# Patient Record
Sex: MALE | Race: WHITE | NOT HISPANIC OR LATINO | URBAN - METROPOLITAN AREA
[De-identification: names, ages, dates, MRNs, and addresses within clinical notes are randomized per-mention and may not be internally consistent; named-entity substitution may affect disease eponyms.]

---

## 2020-08-12 ENCOUNTER — OFFICE VISIT (OUTPATIENT)
Dept: FAMILY MEDICINE CLINIC | Facility: CLINIC | Age: 70
End: 2020-08-12

## 2020-08-12 VITALS
HEIGHT: 72 IN | BODY MASS INDEX: 29.15 KG/M2 | DIASTOLIC BLOOD PRESSURE: 70 MMHG | SYSTOLIC BLOOD PRESSURE: 140 MMHG | WEIGHT: 215.2 LBS | OXYGEN SATURATION: 96 % | HEART RATE: 94 BPM | TEMPERATURE: 98.2 F

## 2020-08-12 DIAGNOSIS — I10 ESSENTIAL HYPERTENSION: ICD-10-CM

## 2020-08-12 DIAGNOSIS — E11.40 PAINFUL DIABETIC NEUROPATHY (HCC): ICD-10-CM

## 2020-08-12 DIAGNOSIS — K21.9 GASTROESOPHAGEAL REFLUX DISEASE WITHOUT ESOPHAGITIS: ICD-10-CM

## 2020-08-12 DIAGNOSIS — E11.42 TYPE 2 DIABETES MELLITUS WITH DIABETIC POLYNEUROPATHY, WITH LONG-TERM CURRENT USE OF INSULIN (HCC): ICD-10-CM

## 2020-08-12 DIAGNOSIS — Z12.11 COLON CANCER SCREENING: ICD-10-CM

## 2020-08-12 DIAGNOSIS — F41.9 CHRONIC ANXIETY: Primary | ICD-10-CM

## 2020-08-12 DIAGNOSIS — Z11.59 ENCOUNTER FOR HEPATITIS C SCREENING TEST FOR LOW RISK PATIENT: ICD-10-CM

## 2020-08-12 DIAGNOSIS — M17.11 PRIMARY OSTEOARTHRITIS OF RIGHT KNEE: ICD-10-CM

## 2020-08-12 DIAGNOSIS — M15.9 OSTEOARTHRITIS, GENERALIZED: ICD-10-CM

## 2020-08-12 DIAGNOSIS — Z79.4 TYPE 2 DIABETES MELLITUS WITH DIABETIC POLYNEUROPATHY, WITH LONG-TERM CURRENT USE OF INSULIN (HCC): ICD-10-CM

## 2020-08-12 PROBLEM — F41.1 GAD (GENERALIZED ANXIETY DISORDER): Status: ACTIVE | Noted: 2018-01-30

## 2020-08-12 PROBLEM — N40.0 BPH (BENIGN PROSTATIC HYPERPLASIA): Status: ACTIVE | Noted: 2018-01-30

## 2020-08-12 PROCEDURE — G0439 PPPS, SUBSEQ VISIT: HCPCS | Performed by: FAMILY MEDICINE

## 2020-08-12 RX ORDER — DONEPEZIL HYDROCHLORIDE 10 MG/1
10 TABLET, FILM COATED ORAL DAILY
COMMUNITY
Start: 2020-03-11 | End: 2020-10-21

## 2020-08-12 RX ORDER — AMLODIPINE BESYLATE AND BENAZEPRIL HYDROCHLORIDE 10; 40 MG/1; MG/1
1 CAPSULE ORAL DAILY
COMMUNITY
Start: 2019-09-14

## 2020-08-12 RX ORDER — DULOXETIN HYDROCHLORIDE 30 MG/1
30 CAPSULE, DELAYED RELEASE ORAL
COMMUNITY
Start: 2019-09-14 | End: 2020-08-12

## 2020-08-12 RX ORDER — DIAZEPAM 5 MG/1
5 TABLET ORAL EVERY 8 HOURS PRN
Qty: 270 TABLET | Refills: 0 | Status: SHIPPED | OUTPATIENT
Start: 2020-08-12 | End: 2020-10-21 | Stop reason: SDUPTHER

## 2020-08-12 RX ORDER — GABAPENTIN 100 MG/1
100 CAPSULE ORAL
COMMUNITY
Start: 2019-09-19 | End: 2020-08-12

## 2020-08-12 RX ORDER — MELOXICAM 15 MG/1
15 TABLET ORAL DAILY
Qty: 90 TABLET | Refills: 1 | Status: SHIPPED | OUTPATIENT
Start: 2020-08-12

## 2020-08-12 RX ORDER — PAROXETINE HYDROCHLORIDE 20 MG/1
20 TABLET, FILM COATED ORAL DAILY
COMMUNITY
Start: 2020-06-15 | End: 2021-06-15

## 2020-08-12 NOTE — PROGRESS NOTES
Subsequent Medicare Wellness Visit   The ABC's of the Annual Wellness Visit    Chief Complaint   Patient presents with   • Medicare Wellness-subsequent       HPI:  Isaiah Bruno, -1950, is a 69 y.o. male who presents for a Subsequent Medicare Wellness Visit.    htn- doing well on meds  gerd- doing well  dm2- doing well on meds, no lows, taking 45 units Qam, 10 units at lunch, and 45 units at night.   Neuropathy- well controlled on meds, in feet, wants to stop meds as he can do well off it and it caused too much sedation.   OA- in knee, topicals not helping. Has seen ortho and injections not helping.   Anxiety- doing ok on meds, still needing valium and not able to taper off.     Recent Hospitalizations:  No hospitalization(s) within the last year..    Current Medical Providers:  Patient Care Team:  Gwyn Rowe Jr., MD (Inactive) as PCP - General (Internal Medicine)    Health Habits and Functional and Cognitive Screening and Depression Screening:  Functional & Cognitive Status 2020   Do you have difficulty preparing food and eating? No   Do you have difficulty bathing yourself, getting dressed or grooming yourself? No   Do you have difficulty using the toilet? No   Do you have difficulty moving around from place to place? No   Do you have trouble with steps or getting out of a bed or a chair? No   Current Diet Well Balanced Diet   Dental Exam Up to date   Eye Exam Up to date   Exercise (times per week) 0 times per week   Current Exercise Activities Include None   Do you need help using the phone?  No   Are you deaf or do you have serious difficulty hearing?  Yes   Do you need help with transportation? No   Do you need help shopping? No   Do you need help preparing meals?  No   Do you need help with housework?  No   Do you need help with laundry? No   Do you need help taking your medications? No   Do you need help managing money? No   Do you ever drive or ride in a car without wearing a seat belt?  Yes   Have you felt unusual stress, anger or loneliness in the last month? Yes   Who do you live with? Spouse   If you need help, do you have trouble finding someone available to you? No   Do you have difficulty concentrating, remembering or making decisions? No       Compared to one year ago, the patient feels his physical health is the same and his mental health is the same.    Depression Screen:  No flowsheet data found.      Past Medical/Family/Social History:  The following portions of the patient's history were reviewed and updated as appropriate: allergies, current medications, past family history, past medical history, past social history, past surgical history and problem list.    No Known Allergies      Current Outpatient Medications:   •  amLODIPine-benazepril (LOTREL) 10-40 MG per capsule, Take 1 capsule by mouth Daily., Disp: , Rfl:   •  JT CONTOUR TEST test strip, TEST BLOOD SUGAR TID, Disp: , Rfl: 10  •  Blood Glucose Monitoring Suppl (Amplidata CONTOUR MONITOR) W/DEVICE kit, USE TO TEST BLOOD SUGAR UTD, Disp: , Rfl: 1  •  diazePAM (VALIUM) 5 MG tablet, Take 1 tablet by mouth Every 8 (Eight) Hours As Needed for Anxiety. TK 1 T PO TID, Disp: 270 tablet, Rfl: 0  •  MICROLET LANCETS misc, USE TO TEST BLOOD SUGAR TID, Disp: , Rfl: 11  •  NOVOLOG MIX 70/30 FLEXPEN (70-30) 100 UNIT/ML suspension pen-injector injection, 30 Units 2 (two) times a day before meals., Disp: , Rfl:   •  PARoxetine (PAXIL) 20 MG tablet, Take 20 mg by mouth Daily., Disp: , Rfl:   •  donepezil (ARICEPT) 10 MG tablet, Take 10 mg by mouth Daily., Disp: , Rfl:   •  hydrochlorothiazide (HYDRODIURIL) 12.5 MG tablet, Take 1 tablet by mouth daily. TK 1 T PO QD, Disp: 90 tablet, Rfl: 3  •  meloxicam (Mobic) 15 MG tablet, Take 1 tablet by mouth Daily., Disp: 90 tablet, Rfl: 1  •  metFORMIN (GLUCOPHAGE) 500 MG tablet, 500 mg., Disp: , Rfl:     Aspirin use counseling: Does not need ASA (and currently is not on it)    Current medication list  "contains high risk medications. No harmful drug interactions have been identified.  Plan of action quality of life    Family History   Problem Relation Age of Onset   • Stroke Mother    • Diabetes Mother    • Anxiety disorder Mother    • No Known Problems Father        Social History     Tobacco Use   • Smoking status: Current Every Day Smoker   • Smokeless tobacco: Never Used   Substance Use Topics   • Alcohol use: No       Past Surgical History:   Procedure Laterality Date   • FINGER TENDON REPAIR      right ring finger   • HIATAL HERNIA REPAIR  1978   • KNEE ARTHROSCOPY         Patient Active Problem List   Diagnosis   • Type 2 diabetes mellitus (CMS/HCC)   • Essential hypertension   • Osteoarthritis of knee   • Chronic anxiety   • Acute arthritis   • BPH (benign prostatic hyperplasia)   • TOMMY (generalized anxiety disorder)   • GERD (gastroesophageal reflux disease)   • Osteoarthritis, generalized   • Painful diabetic neuropathy (CMS/HCC)   • Encounter for hepatitis C screening test for low risk patient       Review of Systems   Respiratory: Negative for shortness of breath.    Cardiovascular: Negative for chest pain.       Objective     Vitals:    08/12/20 1129   BP: 140/70   Pulse: 94   Temp: 98.2 °F (36.8 °C)   TempSrc: Temporal   SpO2: 96%   Weight: 97.6 kg (215 lb 3.2 oz)   Height: 182.9 cm (72\")       Patient's Body mass index is 29.19 kg/m². BMI is above normal parameters. Recommendations include: exercise counseling.      No exam data present    The patient has no evidence of cognitve impairment.  The patient has no evidence of cognitive impairment. 3/3 items were correctly remembered at 5 minutes.     Physical Exam   Constitutional: He is oriented to person, place, and time. He appears well-developed and well-nourished.   Cardiovascular: Normal rate, regular rhythm, normal heart sounds and intact distal pulses.   Pulmonary/Chest: Effort normal and breath sounds normal.   Musculoskeletal: Normal range of " motion. He exhibits no edema.   Neurological: He is alert and oriented to person, place, and time.   Skin: Skin is warm and dry.   Psychiatric: He has a normal mood and affect. His behavior is normal.       Recent Lab Results:  Lab Results   Component Value Date     (H) 03/11/2020     Lab Results   Component Value Date    TRIG 82 03/11/2020    HDL 54 03/11/2020    VLDL 16 03/11/2020       Assessment/Plan   Age-appropriate Screening Schedule:  Refer to the list below for future screening recommendations based on patient's age, sex and/or medical conditions.      Health Maintenance   Topic Date Due   • TDAP/TD VACCINES (1 - Tdap) 11/13/1959   • ZOSTER VACCINE (1 of 2) 11/13/1998   • DIABETIC FOOT EXAM  04/01/2016   • DIABETIC EYE EXAM  04/01/2016   • INFLUENZA VACCINE  08/01/2020   • HEMOGLOBIN A1C  09/11/2020   • URINE MICROALBUMIN  03/11/2021       Medicare Risks and Personalized Health Plan:  Advance Directive Discussion      CMS-Preventive Services Quick Reference  Medicare Preventive Services Addressed:  Annual Wellness Visit (AWV)    Advance Care Planning:  ACP discussion was held with the patient during this visit. Patient does not have an advance directive, information provided.    Diagnoses and all orders for this visit:    1. Chronic anxiety (Primary)  -     diazePAM (VALIUM) 5 MG tablet; Take 1 tablet by mouth Every 8 (Eight) Hours As Needed for Anxiety. TK 1 T PO TID  Dispense: 270 tablet; Refill: 0    2. Primary osteoarthritis of right knee  -     meloxicam (Mobic) 15 MG tablet; Take 1 tablet by mouth Daily.  Dispense: 90 tablet; Refill: 1    3. Essential hypertension    4. Gastroesophageal reflux disease without esophagitis    5. Type 2 diabetes mellitus with diabetic polyneuropathy, with long-term current use of insulin (CMS/[facility])  -     Comprehensive Metabolic Panel  -     Lipid Panel  -     Hemoglobin A1c  -     Microalbumin / Creatinine Urine Ratio - Urine, Clean Catch    6. Painful diabetic  neuropathy (CMS/HCC)    7. Osteoarthritis, generalized    8. Encounter for hepatitis C screening test for low risk patient  -     Hepatitis C Antibody    9. Colon cancer screening  -     Cologuard - Stool, Per Rectum        An After Visit Summary and PPPS with all of these plans were given to the patient.      Follow Up:  Return in about 6 months (around 2/12/2021) for Recheck.          Risks and benefits discussed and f/u in 1 month. If mobic does not work, will consider adding in hydrocodone again but only once a day. Cont meds. Discussed risk factors.

## 2020-08-13 ENCOUNTER — TELEPHONE (OUTPATIENT)
Dept: FAMILY MEDICINE CLINIC | Facility: CLINIC | Age: 70
End: 2020-08-13

## 2020-08-13 LAB
ALBUMIN SERPL-MCNC: 4.3 G/DL (ref 3.5–5.2)
ALBUMIN/CREAT UR: 13 MG/G CREAT (ref 0–29)
ALBUMIN/GLOB SERPL: 2.3 G/DL
ALP SERPL-CCNC: 100 U/L (ref 39–117)
ALT SERPL-CCNC: 11 U/L (ref 1–41)
AST SERPL-CCNC: 14 U/L (ref 1–40)
BILIRUB SERPL-MCNC: 0.2 MG/DL (ref 0–1.2)
BUN SERPL-MCNC: 23 MG/DL (ref 8–23)
BUN/CREAT SERPL: 22.8 (ref 7–25)
CALCIUM SERPL-MCNC: 9.4 MG/DL (ref 8.6–10.5)
CHLORIDE SERPL-SCNC: 100 MMOL/L (ref 98–107)
CHOLEST SERPL-MCNC: 167 MG/DL (ref 0–200)
CO2 SERPL-SCNC: 27.1 MMOL/L (ref 22–29)
CREAT SERPL-MCNC: 1.01 MG/DL (ref 0.76–1.27)
CREAT UR-MCNC: 171.3 MG/DL
GLOBULIN SER CALC-MCNC: 1.9 GM/DL
GLUCOSE SERPL-MCNC: 244 MG/DL (ref 65–99)
HBA1C MFR BLD: 7.1 % (ref 4.8–5.6)
HCV AB S/CO SERPL IA: <0.1 S/CO RATIO (ref 0–0.9)
HDLC SERPL-MCNC: 52 MG/DL (ref 40–60)
LDLC SERPL CALC-MCNC: 92 MG/DL (ref 0–100)
MICROALBUMIN UR-MCNC: 22.5 UG/ML
POTASSIUM SERPL-SCNC: 4.9 MMOL/L (ref 3.5–5.2)
PROT SERPL-MCNC: 6.2 G/DL (ref 6–8.5)
SODIUM SERPL-SCNC: 138 MMOL/L (ref 136–145)
TRIGL SERPL-MCNC: 113 MG/DL (ref 0–150)
VLDLC SERPL CALC-MCNC: 22.6 MG/DL

## 2020-08-13 NOTE — TELEPHONE ENCOUNTER
Patient called regarding the prescription for Valium.  He said the instructions must just say name brand only.  He says when it was sent over it states patient prefers, but it can't say that.  He wants to know if a updated prescription could be sent in please or if someone could call the pharmacy.  It goes to [facility], phone number is [phone].  His number is [phone]. He wants to know if this could be done today.  He says the way it's written now, it will cost him 1300.00

## 2020-08-17 ENCOUNTER — TELEPHONE (OUTPATIENT)
Dept: FAMILY MEDICINE CLINIC | Facility: CLINIC | Age: 70
End: 2020-08-17

## 2020-08-17 NOTE — TELEPHONE ENCOUNTER
Pt states that generic does not work and needs name brand for Valuim.    Pt wants to be informed when this has been taken care of.

## 2020-08-17 NOTE — TELEPHONE ENCOUNTER
[facility] needs clarification on Valium. RX says do not substitute. Is this provider do not substitute or patient do ot substitute? Says it makes a difference in copay. Ref #[id number]. Phone [phone]

## 2020-10-21 ENCOUNTER — OFFICE VISIT (OUTPATIENT)
Dept: FAMILY MEDICINE CLINIC | Facility: CLINIC | Age: 70
End: 2020-10-21

## 2020-10-21 VITALS
HEIGHT: 72 IN | TEMPERATURE: 98.7 F | WEIGHT: 214 LBS | OXYGEN SATURATION: 98 % | HEART RATE: 80 BPM | BODY MASS INDEX: 28.99 KG/M2 | DIASTOLIC BLOOD PRESSURE: 68 MMHG | SYSTOLIC BLOOD PRESSURE: 140 MMHG

## 2020-10-21 DIAGNOSIS — F41.9 CHRONIC ANXIETY: ICD-10-CM

## 2020-10-21 DIAGNOSIS — Z23 NEED FOR IMMUNIZATION AGAINST INFLUENZA: ICD-10-CM

## 2020-10-21 DIAGNOSIS — M15.9 OSTEOARTHRITIS, GENERALIZED: Primary | ICD-10-CM

## 2020-10-21 DIAGNOSIS — F41.1 GAD (GENERALIZED ANXIETY DISORDER): ICD-10-CM

## 2020-10-21 PROBLEM — Z11.59 ENCOUNTER FOR HEPATITIS C SCREENING TEST FOR LOW RISK PATIENT: Status: RESOLVED | Noted: 2020-08-12 | Resolved: 2020-10-21

## 2020-10-21 PROCEDURE — 99214 OFFICE O/P EST MOD 30 MIN: CPT | Performed by: FAMILY MEDICINE

## 2020-10-21 PROCEDURE — G0008 ADMIN INFLUENZA VIRUS VAC: HCPCS | Performed by: FAMILY MEDICINE

## 2020-10-21 PROCEDURE — 90694 VACC AIIV4 NO PRSRV 0.5ML IM: CPT | Performed by: FAMILY MEDICINE

## 2020-10-21 RX ORDER — DIAZEPAM 5 MG/1
5 TABLET ORAL EVERY 8 HOURS PRN
Qty: 270 TABLET | Refills: 0 | Status: SHIPPED | OUTPATIENT
Start: 2020-10-21 | End: 2020-10-27

## 2020-10-21 RX ORDER — HYDROCODONE BITARTRATE AND ACETAMINOPHEN 7.5; 325 MG/1; MG/1
1 TABLET ORAL EVERY 6 HOURS PRN
Qty: 90 TABLET | Refills: 0 | Status: SHIPPED | OUTPATIENT
Start: 2020-10-21 | End: 2021-03-10

## 2020-10-21 RX ORDER — DIAZEPAM 5 MG/1
5 TABLET ORAL EVERY 8 HOURS PRN
Qty: 270 TABLET | Refills: 0 | Status: SHIPPED | OUTPATIENT
Start: 2020-10-21 | End: 2020-10-21 | Stop reason: SDUPTHER

## 2020-10-21 NOTE — PROGRESS NOTES
"Subjective   Isaiah Bruno is a 69 y.o. male.     Chief Complaint   Patient presents with   • Pain     joint pain- med didn't help       History of Present Illness   OA- in knee, topicals not helping. Has seen ortho and injections not helping.   Tried meloxicam and it was not helping.  Is been on hydrocodone in the past and this controls his pain well.  He is wanting to go back on this. It helps him do adl's.     Anxiety- doing ok on meds, still needing valium and not able to taper off. Moods good.     The following portions of the patient's history were reviewed and updated as appropriate: allergies, current medications, past family history, past medical history, past social history, past surgical history and problem list.    Past Medical History:   Diagnosis Date   • Anxiety    • Diabetes mellitus (CMS/HCC)    • Hypertension    • Osteoarthritis    • Premature ventricular contractions        Past Surgical History:   Procedure Laterality Date   • CATARACT EXTRACTION     • EYE CAPSULOTOMY WITH LASER     • FINGER TENDON REPAIR      right ring finger   • HIATAL HERNIA REPAIR  1978   • KNEE ARTHROSCOPY         Family History   Problem Relation Age of Onset   • Stroke Mother    • Diabetes Mother    • Anxiety disorder Mother    • No Known Problems Father        Social History     Socioeconomic History   • Marital status:      Spouse name: Not on file   • Number of children: Not on file   • Years of education: Not on file   • Highest education level: Not on file   Tobacco Use   • Smoking status: Current Every Day Smoker   • Smokeless tobacco: Never Used   Substance and Sexual Activity   • Alcohol use: No   • Drug use: Never       Review of Systems   Constitutional: Negative for fever.   Respiratory: Negative for shortness of breath.        Objective   Visit Vitals  /68 (BP Location: Right arm, Patient Position: Sitting, Cuff Size: Large Adult)   Pulse 80   Temp 98.7 °F (37.1 °C) (Infrared)   Ht 182.9 cm (72\") "   Wt 97.1 kg (214 lb)   SpO2 98%   BMI 29.02 kg/m²     Body mass index is 29.02 kg/m².  Physical Exam  Constitutional:       Appearance: Normal appearance. He is well-developed.   Cardiovascular:      Rate and Rhythm: Normal rate and regular rhythm.      Heart sounds: Normal heart sounds.   Pulmonary:      Effort: Pulmonary effort is normal.      Breath sounds: Normal breath sounds.   Musculoskeletal: Normal range of motion.         General: No swelling.   Skin:     General: Skin is warm and dry.      Findings: No rash.   Neurological:      General: No focal deficit present.      Mental Status: He is alert and oriented to person, place, and time.   Psychiatric:         Mood and Affect: Mood normal.         Behavior: Behavior normal.           Assessment/Plan   Diagnoses and all orders for this visit:    1. Osteoarthritis, generalized (Primary)  -     HYDROcodone-acetaminophen (NORCO) 7.5-325 MG per tablet; Take 1 tablet by mouth Every 6 (Six) Hours As Needed for Moderate Pain .  Dispense: 90 tablet; Refill: 0    2. TOMMY (generalized anxiety disorder)    3. Chronic anxiety  -     diazePAM (VALIUM) 5 MG tablet; Take 1 tablet by mouth Every 8 (Eight) Hours As Needed for Anxiety. TK 1 T PO TID  Dispense: 270 tablet; Refill: 0    4. Need for immunization against influenza  -     Fluad Quad 65+ yrs ([id number])        Rolf run and reviewed. Discussed risks including but not limited to fatigue, falls, constipation, somnolence, dependence and addiction. Also discussed alternatives etc.     Cont meds, added pain med back in. F/u in 3 months.

## 2020-10-21 NOTE — TELEPHONE ENCOUNTER
Patient called and states the Valium that was called in was supposed to be called in as name brand only, but the generic was sent over.  He wants to know if it could be called in to [facility].  His phone number is [phone].

## 2020-10-21 NOTE — TELEPHONE ENCOUNTER
Patient forgot to mention that he needs a refill on Novolog 70/30 flex pen sent to [facility]

## 2020-10-24 ENCOUNTER — PATIENT MESSAGE (OUTPATIENT)
Dept: FAMILY MEDICINE CLINIC | Facility: CLINIC | Age: 70
End: 2020-10-24

## 2020-10-26 NOTE — TELEPHONE ENCOUNTER
[facility] called and said they canceled the generic Valium. They need a new prescription called in for name brand only Valium. Patient is running short of his medicine.  Yenifer [phone]

## 2020-10-26 NOTE — TELEPHONE ENCOUNTER
Pt spouse called and stated that the pharmacy gave them the following ph# [phone] to call regarding his medication.

## 2020-10-26 NOTE — TELEPHONE ENCOUNTER
I put on his prescription that it is to be name brand only.  I checked the dispense as written box.  And I put it in his message to the pharmacy.  This is a pharmacy problem.  I do not know if you need to call the pharmacy or what.

## 2020-10-26 NOTE — TELEPHONE ENCOUNTER
Patient's wife called regarding the Valium, she said it needs to be name brand.  She wants to know if some one could call regarding this and and let her know when it's done.  Her number is [phone].

## 2020-10-27 DIAGNOSIS — F41.9 CHRONIC ANXIETY: ICD-10-CM

## 2020-10-27 RX ORDER — DIAZEPAM 5 MG
5 TABLET ORAL EVERY 8 HOURS PRN
Qty: 270 TABLET | Refills: 0 | Status: SHIPPED | OUTPATIENT
Start: 2020-10-27 | End: 2020-10-28 | Stop reason: SDUPTHER

## 2020-10-27 RX ORDER — DIAZEPAM 5 MG
TABLET ORAL
Qty: 270 TABLET | Refills: 0 | Status: SHIPPED | OUTPATIENT
Start: 2020-10-27 | End: 2020-10-27 | Stop reason: SDUPTHER

## 2020-10-28 DIAGNOSIS — F41.9 CHRONIC ANXIETY: ICD-10-CM

## 2020-10-28 RX ORDER — DIAZEPAM 5 MG
5 TABLET ORAL EVERY 8 HOURS PRN
Qty: 270 TABLET | Refills: 0 | Status: SHIPPED | OUTPATIENT
Start: 2020-10-28

## 2020-10-28 NOTE — TELEPHONE ENCOUNTER
CHOLO De [facility] called and states that the patient orginally stated that he didn't want to fill the valium that was sent over yesterday so a hold was placed on the prescription.  Then he called back and stated he did want the medication.  She said since it's a controlled substance they can't remove the hold and a new prescription would need to be sent over.  She wants to know if a new one could be sent over today please.  Pharmacy phone number is [phone].

## 2020-11-05 ENCOUNTER — TELEPHONE (OUTPATIENT)
Dept: FAMILY MEDICINE CLINIC | Facility: CLINIC | Age: 70
End: 2020-11-05

## 2020-11-05 NOTE — TELEPHONE ENCOUNTER
[facility] message sent to patient on how he is taking the insulin.  This was a transferred prescription from his previous provider.

## 2020-11-05 NOTE — TELEPHONE ENCOUNTER
[facility] STATES THE FOLLOWING INSTRUCTIONS ON THIS MEDICATION ARE TO VAGUE:    Pharmacy:  [facility] - [place], [place] - [address] AT Portal to Registered [facility] Sites - [phone]  - [phone]   Phone:  [phone]   Fax:  [phone]   Address:  [facility] [address] [facility] [address]   Pharmacy Comments: --          Fill quantity remaining: -- Fill quantity used: -- Next fill due: --       Warnings History    No Interaction Warnings Shown    Pharmacist Clinical Review History    This prescription has not been clinically reviewed.   E-Prescribing Status    Outpatient Medication Detail    insulin aspart protamine & aspart (NOVOLOG) 70/30 100 unit/mL        Sig: Inject 45 units under the skin into the appropriate area as directed.        Sent to pharmacy as: Insulin Aspart Prot & Aspart (70-30) 100 UNIT/ML Suspension Pen-injector (NOVOLOG)        Class: Normal        E-Prescribing Status: Receipt confirmed by pharmacy (10/23/2020 12:33 PM EDT)          3 PACKS FOR 90 DAYS?      NEED FREQUENCY OF INJECTION.    REFERENCE #: [id number]    [facility] [phone]

## 2020-12-23 ENCOUNTER — TELEPHONE (OUTPATIENT)
Dept: FAMILY MEDICINE CLINIC | Facility: CLINIC | Age: 70
End: 2020-12-23

## 2020-12-23 DIAGNOSIS — Z12.11 COLON CANCER SCREENING: Primary | ICD-10-CM

## 2020-12-23 NOTE — TELEPHONE ENCOUNTER
Received fax from [facility] -   Pt has indicated they do not wish to complete screening.  Pt wants a C-sope

## 2021-03-10 ENCOUNTER — OFFICE VISIT (OUTPATIENT)
Dept: ORTHOPEDIC SURGERY | Facility: CLINIC | Age: 71
End: 2021-03-10

## 2021-03-10 VITALS — HEIGHT: 72 IN | WEIGHT: 218 LBS | TEMPERATURE: 97 F | BODY MASS INDEX: 29.53 KG/M2

## 2021-03-10 DIAGNOSIS — M19.079 ARTHRITIS OF ANKLE: ICD-10-CM

## 2021-03-10 DIAGNOSIS — M19.071 ARTHRITIS OF FIRST METATARSOPHALANGEAL (MTP) JOINT OF RIGHT FOOT: ICD-10-CM

## 2021-03-10 DIAGNOSIS — M19.079 ARTHRITIS OF FOOT: ICD-10-CM

## 2021-03-10 DIAGNOSIS — R52 PAIN: Primary | ICD-10-CM

## 2021-03-10 DIAGNOSIS — M92.60 HAGLUND'S DEFORMITY: ICD-10-CM

## 2021-03-10 DIAGNOSIS — M19.072 ARTHRITIS OF FIRST METATARSOPHALANGEAL (MTP) JOINT OF LEFT FOOT: ICD-10-CM

## 2021-03-10 DIAGNOSIS — M65.28 CALCIFIC ACHILLES TENDONITIS: ICD-10-CM

## 2021-03-10 PROCEDURE — 73630 X-RAY EXAM OF FOOT: CPT | Performed by: ORTHOPAEDIC SURGERY

## 2021-03-10 PROCEDURE — 99204 OFFICE O/P NEW MOD 45 MIN: CPT | Performed by: ORTHOPAEDIC SURGERY

## 2021-03-10 PROCEDURE — 73610 X-RAY EXAM OF ANKLE: CPT | Performed by: ORTHOPAEDIC SURGERY

## 2021-03-10 RX ORDER — ESCITALOPRAM OXALATE 10 MG/1
20 TABLET ORAL DAILY
COMMUNITY
Start: 2021-01-05

## 2021-03-10 RX ORDER — LUTEIN 10 MG
TABLET ORAL
COMMUNITY
End: 2023-03-08 | Stop reason: HOSPADM

## 2021-03-10 NOTE — PROGRESS NOTES
New Patient Complaint      Patient: Isaiah Bruno  YOB: 1950 70 y.o. male  Medical Record Number: [id number]    Chief Complaints: Feet hurt    History of Present Illness: Patient had previous right foot cheilectomy done by Dr. Mccray in 2003.    He reports chronic moderate to severe aching pain in both feet left greater than right that has worsened recently as well as a 1 month history of moderate aching pain in the left ankle.  Symptoms are associated with throbbing grinding aching swelling and stiffness worse with standing walking and stairs improved with rest.  He previously been on hydrocodone but took himself off of that and uses etodolac.  Symptoms have worsened recently in the left foot much more so than the right.     He has tried multiple orthotics in the past and is somewhat reluctant about joint injection as he has had his knee injected in the past that altered his blood sugars.        HPI    Allergies: No Known Allergies    Medications:   Current Outpatient Medications on File Prior to Visit   Medication Sig   • amLODIPine-benazepril (LOTREL) 10-40 MG per capsule Take 1 capsule by mouth Daily.   • Cholecalciferol 50 MCG (2000 UT) tablet Take  by mouth.   • escitalopram (LEXAPRO) 10 MG tablet    • hydrochlorothiazide (HYDRODIURIL) 12.5 MG tablet Take 1 tablet by mouth daily. TK 1 T PO QD   • insulin aspart protamine & aspart (NOVOLOG) 70/30 100 unit/mL Inject 45 units under the skin into the appropriate area as directed.   • Lutein 10 MG tablet Take  by mouth.   • meloxicam (Mobic) 15 MG tablet Take 1 tablet by mouth Daily.   • PARoxetine (PAXIL) 20 MG tablet Take 20 mg by mouth Daily.   • Valium 5 MG tablet Take 1 tablet by mouth Every 8 (Eight) Hours As Needed for Anxiety.   • [DISCONTINUED] HYDROcodone-acetaminophen (NORCO) 7.5-325 MG per tablet Take 1 tablet by mouth Every 6 (Six) Hours As Needed for Moderate Pain .     No current facility-administered medications on file prior to  "visit.       Past Medical History:   Diagnosis Date   • Anxiety    • Diabetes mellitus (CMS/HCC)    • Hypertension    • Osteoarthritis    • Premature ventricular contractions      Past Surgical History:   Procedure Laterality Date   • CATARACT EXTRACTION     • EYE CAPSULOTOMY WITH LASER     • FINGER TENDON REPAIR      right ring finger   • HIATAL HERNIA REPAIR  1978   • KNEE ARTHROSCOPY       Social History     Occupational History   • Not on file   Tobacco Use   • Smoking status: Current Every Day Smoker   • Smokeless tobacco: Never Used   Substance and Sexual Activity   • Alcohol use: No   • Drug use: Never   • Sexual activity: Not on file      Social History     Social History Narrative   • Not on file     Family History   Problem Relation Age of Onset   • Stroke Mother    • Diabetes Mother    • Anxiety disorder Mother    • No Known Problems Father        Review of Systems: 14 point review of systems performed, positive pertinent findings identified in HPI. All remaining systems negative except activity change, hearing loss, palpitations, nervous or anxious    Review of Systems      Physical Exam:   Vitals:    03/10/21 1016   Temp: 97 °F (36.1 °C)   Weight: 98.9 kg (218 lb)   Height: 182.9 cm (72\")   PainSc:   5     Physical Exam   Constitutional: pleasant, well developed Is with his wife  Eyes: sclera non icteric  Hearing : adequate for exam  Cardiovascular: palpable pulses in bilaeral feet, bilateral calves/ thighs NT without sign of DVT  Respiratoy: breathing unlabored   Neurological: grossly sensate to LT throughout bilateral LEs  Psychiatric: oriented with normal mood and affect.   Lymphatic: No palpable popliteal lymphadenopathy bilateral LEs  Skin: intact throughout bilateral legs/feet with the exception of a small abrasion over the dorsal medial aspect of the left first toe from a recent scraping injury without sign of infection  Musculoskeletal: Mild discomfort of the anterior left ankle and distal " tibia.  Moderate discomfort with dorsum of the midfoot left more so than right and well-healed surgical incision right first toe.  There was limited motion of the first MTP joints bilaterally with mild discomfort left greater than right.  Physical Exam  Ortho Exam    Radiology: 3 views of both feet ordered to evaluate pain reviewed and no prior x-rays available for comparison there is significant arthritic change of both first MTP joints with dorsal spurring left greater than right there is also calcifications at the insertion of the Achilles with prominent superior calcaneal tuberosity bilaterally and plantar calcaneal spurring.  There is also moderate arthritic change to the midfoot especially the second and third TMT joints.    3 views left ankle ordered evaluate pain reviewed and compared with lateral view of the ankle done on foot x-rays.  Overall talus appears well-seated within the mortise some well-rounded ossification over the medial distal tibia but no obvious fracture.        Assessment/Plan: Left ankle anterior and distal tibial pain with possible stress fracture  2.  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    A long discussion regarding treatment options he declined any boot or brace for the left ankle.  Recommend a top of this with a cane which she already has and has been intermittently using some Voltaren gel which seems to give him some relief and will continue as such.    We need to get an MRI of his left ankle and foot to evaluate for stress fracture and/or determine degree of arthrosis.    We will see him back in 3 to 4 weeks to review MRIs assess progress and determine treatment course going forward.    Recommend the patient see primary care provider for issues outlined in review of symptoms not pertinent to current orthopedic complaints    This is  "an addendum to patient's previous note.  This addendum was placed on 3/25/2021.  I spoke with patient and he was upset about the problems in his chart regarding use of hydrocodone.  He said he did not have any problem with me at all but was upset about these problems in the chart regarding hydrocodone.  He stated that he had quit taking hydrocodone in May 2019 and his primary care provider had prescribed it again for him in October 2020 but he never had it filled.  Plan I would make sure this was clear in my record as far as adding it to this note and will place in a telephone encounter as well and he has been working with Magali Duque on getting his record \"straightened out.    "

## 2021-03-12 ENCOUNTER — TELEPHONE (OUTPATIENT)
Dept: ORTHOPEDIC SURGERY | Facility: CLINIC | Age: 71
End: 2021-03-12

## 2021-03-12 NOTE — TELEPHONE ENCOUNTER
"Called pt to get some clarification on what he's talking about when he states we've \"made mess of his chart\" , no answer. I looked in pts meds and I don't even see any hydrocodone?? LVM for pt to call back.   "

## 2021-03-12 NOTE — TELEPHONE ENCOUNTER
----- Message from [facility] sent at 3/11/2021 11:10 PM EST -----  Regarding: RE: Prescription Question  Contact: [phone]  To Celestino Claudio,   I was not taking Hydrocodone.  I have not taken in over a year.   dr brewer sent a prescription to dominick at [address] [place] [address] on Aug12,2020.  I call them an canceled it.  My records show all this.  It also was not on my chart.  It showed on her records she sent a prescription for it , It was never filled. I told her I never filled it.  [facility] also have a record it was canceled Also my [employer] employee insurance  show it was never filled.  They keep records of all this.  I went to Dr Brewer only twice in over a year.   I HAD NOT TAKEN HYDROCODONE IN OVER A YEAR.  The goverment also have a record of precription like this I have filled . You had no right to stop medication I was not taking    Isaiah Bruno

## 2021-03-12 NOTE — TELEPHONE ENCOUNTER
----- Message from Isaiah Bruno sent at 3/12/2021 12:01 AM EST -----  Regarding: Prescription Question  Contact: [phone]  Dr Celestino Claudio,     The last prescription of Hydrocodone DR Rees wrote for me was  07/17/2019.   that is when she worked at [facility].    I got all the records on my medication from the federal [employer] insurance.    I can prove it.   I had not had this medication since then.   So it could not be on my [facility] mychart.  I want this corrected.  YOU MADE A MESS OF MY [facility] CHART.    I want it corrected.               Isaiah Bruno

## 2021-03-22 ENCOUNTER — TELEPHONE (OUTPATIENT)
Dept: ORTHOPEDICS | Facility: OTHER | Age: 71
End: 2021-03-22

## 2021-03-22 NOTE — TELEPHONE ENCOUNTER
Provider: PANFILO ALEMAN  Caller: DIAZ MENDOZA  Relationship to Patient: SELF  Pharmacy: [facility]: [address]: [phone]    Phone Number: [phone]    Reason for Call:PATIENT CALLING STATES HE HAS SOME INCORRECT INFO ON HIS CHART REGARDING  MEDICATION THAT WAS  NOTED IN HIS CHART INCORRECTLY.  PATIENT STATES HE HAS NOT TAKEN HYDROCODONE- THE RX WAS CALLED IN TO [facility]- HOWEVER THE PATIENT DID NOT  THE RX- HE IS UPSET THAT THE INFO IS IN HIS RECORD INCORRECTLY- HE WANTS TO HAVE THE INFORMATION REMOVED-BEFORE HE WLL SEEK TREATMENT WITH ANOTHER DOCTOR

## 2021-03-25 ENCOUNTER — TELEPHONE (OUTPATIENT)
Dept: ORTHOPEDIC SURGERY | Facility: CLINIC | Age: 71
End: 2021-03-25

## 2021-03-25 NOTE — TELEPHONE ENCOUNTER
"Call to Mr Bruno again at the request of Dr Claudio.   I again, explained to Mr Bruno that his record only reflects a Hydrocodone RX because it was prescribed by a provider at some point in his past.   We can not remove the medication because we didn't prescribe it.  We will ori it  \"not taking\" but we can't remove it.   I have again reassured him that no one in the office thinks he is taking Hydrocodone, has a problem with Hydrocodone or is abusing it.   He tells me that he has called his pharmacy to reassure them as well and has checked his government records to be sure as well.     I told him he could call me anytime to discuss if he feel that there is a problem in the future.     bsw  "

## 2021-03-25 NOTE — TELEPHONE ENCOUNTER
I called and spoke with patient today after he had canceled his MRIs.  He said he was canceling them because his chart had been messed up regarding his use of hydrocodone and Magali Duque has been speaking with him about this.  He said he did not have any problem with me at all and I told him that I could not change the medications in his chart that would make it clear and today's note and put an addendum from his previous office note from 3/10/2021 that he had not had any hydrocodone since May 2019 and his primary care provider had given him a prescription for in October 2020 but he never had it filled.    Said he wants to hold off on getting the MRI for now but is considering having it done and is going to let me know in the next week or 2 about this and told him I be happy to review order these for him.  He again reiterated that he did not have any problem with me just with the system and the errors in his medical record.

## 2021-03-25 NOTE — TELEPHONE ENCOUNTER
Patient told [facility] that he didn't want to do the MRI of the left foot and ankle.  I have canceled the orders just wanted you to be aware.

## 2021-03-29 ENCOUNTER — TELEPHONE (OUTPATIENT)
Dept: ORTHOPEDIC SURGERY | Facility: CLINIC | Age: 71
End: 2021-03-29

## 2021-03-29 NOTE — TELEPHONE ENCOUNTER
Caller: DIAZ MENDOZA    Phone Number: [phone]    Reason for Call: PT CALLING IN AGAIN TO MAKE SURE THAT THE OFFICE NOTES AND ORIGINAL XRAY WILL BE PAPER COPIES AND NOT ON DISC -  PT STATES TO CALL HIM IF ANY ISSUES , PT ALSO STATES HE NEEDS RECORDS ASAP

## 2021-03-29 NOTE — TELEPHONE ENCOUNTER
Caller: Isaiah Bruno    Relationship: SELF    Best call back number: [phone]    Additional notes: PATIENT CALLED AGAIN TO MAKE SURE THE RECORDS AND IMAGING HE REQUESTED WOULD BE A PAPER COPY. PT SAID HE DID NOT WANT HIS RECORDS ON A DISC BECAUSE HE CANNOT VIEW A DISC. I MENTIONED TO THE PATIENT THAT HIS IMAGING MIGHT BE ON A DISC UNLESS HE JUST WANTED THE XRAY REPORT AND HE SAID IT WAS FINE IF THE IMAGING WAS ON A DISC. PLEASE ADVISE.

## 2021-03-29 NOTE — TELEPHONE ENCOUNTER
Caller:  DIAZ MENDOZA    Relationship: SELF    Best call back number: [phone]    What form or medical record are you requesting: OFFICE NOTES/MEDICAL RECORDS/X-RAYS FROM 3-10-21    Who is requesting this form or medical record from you: SELF    How would you like to receive the form or medical records (pick-up, mail, fax):   PATIENT WOULD LIKE TO  WHEN READY     Timeframe paperwork needed: AS SOON AS POSSIBLE.    Additional notes: PATIENT WOULD LIKE TO  ALL NOTES/X-RAYS/RECORDS FROM 3-10-21 OFFICE VISIT.  HE WOULD LIKE A CALL BACK AS SOON AS THEY READY FOR .

## 2021-03-29 NOTE — TELEPHONE ENCOUNTER
PATIENT INFORMED THAT HIS 3/10 RECORDS/X-RAYS  ARE READY FOR , PATIENT STATED THAT HE DOES NOT NEED THE CD, SO I GAVE IT BACK TO XR TO ALDEN.

## 2021-04-13 ENCOUNTER — TELEPHONE (OUTPATIENT)
Dept: ORTHOPEDIC SURGERY | Facility: CLINIC | Age: 71
End: 2021-04-13

## 2021-04-13 NOTE — TELEPHONE ENCOUNTER
PATIENT WAS SUPPOSE TO COME AND  HIS LAST OFFICE NOTE, GOT A CALL FROM INES THAT PATIENT NOW WANTS NOTE MAILED, PUT IN MAIL TODAY, WILL GO OUT TOMORROW.

## 2021-04-19 ENCOUNTER — TELEPHONE (OUTPATIENT)
Dept: ORTHOPEDIC SURGERY | Facility: CLINIC | Age: 71
End: 2021-04-19

## 2021-04-19 NOTE — TELEPHONE ENCOUNTER
PT. CALLING BACK- STATES THAT HE SPOKE TO INES AND ARNEL LAST WEEK.  PT. WOULD NOT TELL ME ANY INFORMATION ON WHAT ELSE IT IS REGARDING.    WOULD LIKE TO RECEIVE A CALL BACK.   [phone]

## 2021-04-30 ENCOUNTER — TELEPHONE (OUTPATIENT)
Dept: ORTHOPEDIC SURGERY | Facility: CLINIC | Age: 71
End: 2021-04-30

## 2021-04-30 NOTE — TELEPHONE ENCOUNTER
Spoke to  Damion. I advised him I would follow up with Magali when she returns on Monday and would call him back.     scd

## 2021-04-30 NOTE — TELEPHONE ENCOUNTER
"Provider:     Caller: PATIENT    Relationship to Patient: PATIENT      Phone Number:  [phone]    Reason for Call: PT. CALLING- STATES THAT HE HAS SPOKEN TO SEVERAL PEOPLE IN THE OFFICE, INCLUDING RACHEAL MARTEL ONE TIME.  HE IS UPSET ABOUT HIS RECORDS SAYING THAT HE TOOK HYDROCODONE, BUT HE DOES NOT TAKE THIS MEDICATION.   STATES THAT HE HAS NOT TAKEN THIS SINCE 2019, AND DOES NOT TAKE IT AT ALL.   HE DID GET HIS RECORDS IN THE MAIL , AND STATES THAT THERE IS INFORMATION THAT IS NOT CORRECT.  PT. STATES THAT HE WOULD LIKE TO \"SPEAK TO  , OR SPEAK TO SOMEONE IN AUTHORITY.\"  PLEASE SEE SEVERAL PREVIOUS MESSAGES REGARDING THIS.   HE IS CALLING TO SEE IF THIS HAS BEEN CORRECTED IN HIS CHART- THAT HE IS NOT ON HYDROCODONE.   HE IS ASKING IF SOMEONE WILL CALL HIM TODAY, PLEASE.   PT. MAINLY JUST WANTS SOMEONE TO VERIFY THAT HYDROCODONE IS NOT LISTED IN HIS CHART.   PLEASE CALL TO ADVISE.     "

## 2021-05-03 ENCOUNTER — TELEPHONE (OUTPATIENT)
Dept: FAMILY MEDICINE CLINIC | Facility: CLINIC | Age: 71
End: 2021-05-03

## 2021-05-03 NOTE — TELEPHONE ENCOUNTER
I received communication both written and phone from Sports Med manager Magali Duque.  Patient is a [facility] patient and when reviewing his medications stated he never took Hydrocodone.  At a visit with you, it was written.  Can you please confirm whether the patient ever filled it?  He is asking for an addendum to be put in his chart for that DOS.

## 2021-05-03 NOTE — TELEPHONE ENCOUNTER
I did write for hydrocodone but according to his Rolf report he never filled this.  Do I need to annotate that on the note or will this suffice?

## 2021-05-04 NOTE — TELEPHONE ENCOUNTER
5/3/21  Just spoke Mr. Bruno. He seems to understand there is nothing he needs to worry about. He does not plan to call Teena back as he felt like I explained to him that he does not have an active prescription of hydrocodone and “nobody thinks he is taking it”.     scd

## 2022-04-27 ENCOUNTER — TELEPHONE (OUTPATIENT)
Dept: FAMILY MEDICINE CLINIC | Facility: CLINIC | Age: 72
End: 2022-04-27

## 2022-04-27 NOTE — TELEPHONE ENCOUNTER
-Incoming call from Mr. Bruno. Advised soonest available apt with Dr. Rees is already scheduled.     -Offered to schedule with other provider sooner- patient declined    -Advised he would think about it and call back.

## 2022-04-27 NOTE — TELEPHONE ENCOUNTER
Caller: Isaiah Bruno    Relationship to patient: Self    Best call back number: [phone]    Patient is needing: PATIENT STATES HE HAS AN APPOINTMENT SCHEDULED 5-18-22, BUT HE IS IN BAD SHAPE AND NEEDS TO SPEAK TO DR WARD TODAY  PLEASE ADVISE

## 2022-04-27 NOTE — TELEPHONE ENCOUNTER
Caller: Isaiah Bruno    Relationship: Self    Best call back number: [phone]     What is the best time to reach you: ANY TIME     Who are you requesting to speak with (clinical staff, provider,  specific staff member): CLINICAL/DR. WARD    Do you know the name of the person who called: PATIENT    What was the call regarding:  PATIENT WOULD LIKE DR. WARD TO CALL HIM BACK REGARDING THE PAIN IN HIS FEET, HIS ARTHRITIS, A DIABETIC ULCER THAT IS  NOW HEALED.  PATIENT STATES DR. WARD IS THE ONLY MEDICAL PERSON WHO KNOWS HIM AND HAS ALWAYS HELPED HIM.   PATIENT WANTS TO TRANSFER HIS CARE BACK TO DR. MOYER.    Do you require a callback:  YES

## 2022-04-27 NOTE — TELEPHONE ENCOUNTER
Tried calling pt to inform him he needs an appointment, unable to leave a vm    Ok for hub to read

## 2022-04-27 NOTE — TELEPHONE ENCOUNTER
Caller: Isaiah Bruno    Relationship: Self    Best call back number: [phone]    What is the best time to reach you: ANY    Who are you requesting to speak with (clinical staff, provider,  specific staff member): CLINICAL STAFF    Do you know the name of the person who called: PATIENT    What was the call regarding: PATIENT WOULD LIKE DR. WARD TO CALL HIM BACK.  HE NEEDS TO TALK TO HER    Do you require a callback: YES

## 2022-11-17 ENCOUNTER — APPOINTMENT (OUTPATIENT)
Dept: CT IMAGING | Facility: HOSPITAL | Age: 72
End: 2022-11-17

## 2022-11-17 PROCEDURE — 99285 EMERGENCY DEPT VISIT HI MDM: CPT

## 2022-11-17 PROCEDURE — 70450 CT HEAD/BRAIN W/O DYE: CPT

## 2022-11-17 PROCEDURE — 36415 COLL VENOUS BLD VENIPUNCTURE: CPT

## 2022-11-18 ENCOUNTER — APPOINTMENT (OUTPATIENT)
Dept: CT IMAGING | Facility: HOSPITAL | Age: 72
End: 2022-11-18

## 2022-11-18 ENCOUNTER — HOSPITAL ENCOUNTER (OUTPATIENT)
Facility: HOSPITAL | Age: 72
LOS: 1 days | Discharge: NURSING, REHAB OR LONG-TERM CARE | End: 2022-11-18
Attending: EMERGENCY MEDICINE | Admitting: INTERNAL MEDICINE

## 2022-11-18 ENCOUNTER — APPOINTMENT (OUTPATIENT)
Dept: GENERAL RADIOLOGY | Facility: HOSPITAL | Age: 72
End: 2022-11-18

## 2022-11-18 VITALS
WEIGHT: 226.9 LBS | OXYGEN SATURATION: 94 % | HEART RATE: 71 BPM | BODY MASS INDEX: 30.73 KG/M2 | DIASTOLIC BLOOD PRESSURE: 88 MMHG | RESPIRATION RATE: 20 BRPM | TEMPERATURE: 98.2 F | SYSTOLIC BLOOD PRESSURE: 142 MMHG | HEIGHT: 72 IN

## 2022-11-18 DIAGNOSIS — S82.891A CLOSED FRACTURE OF RIGHT ANKLE, INITIAL ENCOUNTER: ICD-10-CM

## 2022-11-18 DIAGNOSIS — R26.2 INABILITY TO AMBULATE DUE TO ANKLE OR FOOT: ICD-10-CM

## 2022-11-18 DIAGNOSIS — M19.90 ARTHRITIS: ICD-10-CM

## 2022-11-18 DIAGNOSIS — R29.6 MULTIPLE FALLS: Primary | ICD-10-CM

## 2022-11-18 PROBLEM — M19.071 ARTHRITIS OF BOTH FEET: Status: ACTIVE | Noted: 2022-10-25

## 2022-11-18 PROBLEM — E16.2 HYPOGLYCEMIA: Status: ACTIVE | Noted: 2022-11-18

## 2022-11-18 PROBLEM — M19.072 ARTHRITIS OF BOTH FEET: Status: ACTIVE | Noted: 2022-10-25

## 2022-11-18 PROBLEM — M77.40 METATARSALGIA: Status: ACTIVE | Noted: 2022-11-18

## 2022-11-18 PROBLEM — E16.2 HYPOGLYCEMIA: Status: RESOLVED | Noted: 2022-11-18 | Resolved: 2022-11-18

## 2022-11-18 LAB
ALBUMIN SERPL-MCNC: 4.1 G/DL (ref 3.5–5.2)
ALBUMIN/GLOB SERPL: 1.8 G/DL
ALP SERPL-CCNC: 84 U/L (ref 39–117)
ALT SERPL W P-5'-P-CCNC: 12 U/L (ref 1–41)
ANION GAP SERPL CALCULATED.3IONS-SCNC: 10 MMOL/L (ref 5–15)
ANION GAP SERPL CALCULATED.3IONS-SCNC: 11.9 MMOL/L (ref 5–15)
AST SERPL-CCNC: 16 U/L (ref 1–40)
BASOPHILS # BLD AUTO: 0.03 10*3/MM3 (ref 0–0.2)
BASOPHILS # BLD AUTO: 0.07 10*3/MM3 (ref 0–0.2)
BASOPHILS NFR BLD AUTO: 0.2 % (ref 0–1.5)
BASOPHILS NFR BLD AUTO: 0.7 % (ref 0–1.5)
BILIRUB SERPL-MCNC: 0.2 MG/DL (ref 0–1.2)
BILIRUB UR QL STRIP: NEGATIVE
BUN SERPL-MCNC: 15 MG/DL (ref 8–23)
BUN SERPL-MCNC: 16 MG/DL (ref 8–23)
BUN/CREAT SERPL: 16.1 (ref 7–25)
BUN/CREAT SERPL: 17.6 (ref 7–25)
CALCIUM SPEC-SCNC: 9 MG/DL (ref 8.6–10.5)
CALCIUM SPEC-SCNC: 9 MG/DL (ref 8.6–10.5)
CHLORIDE SERPL-SCNC: 103 MMOL/L (ref 98–107)
CHLORIDE SERPL-SCNC: 99 MMOL/L (ref 98–107)
CK SERPL-CCNC: 53 U/L (ref 20–200)
CLARITY UR: CLEAR
CO2 SERPL-SCNC: 25 MMOL/L (ref 22–29)
CO2 SERPL-SCNC: 25.1 MMOL/L (ref 22–29)
COLOR UR: YELLOW
CREAT SERPL-MCNC: 0.91 MG/DL (ref 0.76–1.27)
CREAT SERPL-MCNC: 0.93 MG/DL (ref 0.76–1.27)
CRP SERPL-MCNC: <0.3 MG/DL (ref 0–0.5)
DEPRECATED RDW RBC AUTO: 42.4 FL (ref 37–54)
DEPRECATED RDW RBC AUTO: 43.5 FL (ref 37–54)
EGFRCR SERPLBLD CKD-EPI 2021: 86.2 ML/MIN/1.73
EGFRCR SERPLBLD CKD-EPI 2021: 88.4 ML/MIN/1.73
EOSINOPHIL # BLD AUTO: 0.01 10*3/MM3 (ref 0–0.4)
EOSINOPHIL # BLD AUTO: 0.19 10*3/MM3 (ref 0–0.4)
EOSINOPHIL NFR BLD AUTO: 0.1 % (ref 0.3–6.2)
EOSINOPHIL NFR BLD AUTO: 1.8 % (ref 0.3–6.2)
ERYTHROCYTE [DISTWIDTH] IN BLOOD BY AUTOMATED COUNT: 13.1 % (ref 12.3–15.4)
ERYTHROCYTE [DISTWIDTH] IN BLOOD BY AUTOMATED COUNT: 13.2 % (ref 12.3–15.4)
ERYTHROCYTE [SEDIMENTATION RATE] IN BLOOD: 12 MM/HR (ref 0–20)
GLOBULIN UR ELPH-MCNC: 2.3 GM/DL
GLUCOSE BLDC GLUCOMTR-MCNC: 129 MG/DL (ref 70–130)
GLUCOSE BLDC GLUCOMTR-MCNC: 208 MG/DL (ref 70–130)
GLUCOSE BLDC GLUCOMTR-MCNC: 47 MG/DL (ref 70–130)
GLUCOSE SERPL-MCNC: 170 MG/DL (ref 65–99)
GLUCOSE SERPL-MCNC: 38 MG/DL (ref 65–99)
GLUCOSE UR STRIP-MCNC: ABNORMAL MG/DL
HBA1C MFR BLD: 8.4 % (ref 4.8–5.6)
HCT VFR BLD AUTO: 49.4 % (ref 37.5–51)
HCT VFR BLD AUTO: 49.6 % (ref 37.5–51)
HGB BLD-MCNC: 15.8 G/DL (ref 13–17.7)
HGB BLD-MCNC: 16.2 G/DL (ref 13–17.7)
HGB UR QL STRIP.AUTO: NEGATIVE
IMM GRANULOCYTES # BLD AUTO: 0.05 10*3/MM3 (ref 0–0.05)
IMM GRANULOCYTES # BLD AUTO: 0.05 10*3/MM3 (ref 0–0.05)
IMM GRANULOCYTES NFR BLD AUTO: 0.4 % (ref 0–0.5)
IMM GRANULOCYTES NFR BLD AUTO: 0.5 % (ref 0–0.5)
KETONES UR QL STRIP: NEGATIVE
LEUKOCYTE ESTERASE UR QL STRIP.AUTO: NEGATIVE
LYMPHOCYTES # BLD AUTO: 0.9 10*3/MM3 (ref 0.7–3.1)
LYMPHOCYTES # BLD AUTO: 2.93 10*3/MM3 (ref 0.7–3.1)
LYMPHOCYTES NFR BLD AUTO: 28.2 % (ref 19.6–45.3)
LYMPHOCYTES NFR BLD AUTO: 7.3 % (ref 19.6–45.3)
MAGNESIUM SERPL-MCNC: 2 MG/DL (ref 1.6–2.4)
MCH RBC QN AUTO: 28.5 PG (ref 26.6–33)
MCH RBC QN AUTO: 28.9 PG (ref 26.6–33)
MCHC RBC AUTO-ENTMCNC: 32 G/DL (ref 31.5–35.7)
MCHC RBC AUTO-ENTMCNC: 32.7 G/DL (ref 31.5–35.7)
MCV RBC AUTO: 88.4 FL (ref 79–97)
MCV RBC AUTO: 89 FL (ref 79–97)
MONOCYTES # BLD AUTO: 0.73 10*3/MM3 (ref 0.1–0.9)
MONOCYTES # BLD AUTO: 0.94 10*3/MM3 (ref 0.1–0.9)
MONOCYTES NFR BLD AUTO: 5.9 % (ref 5–12)
MONOCYTES NFR BLD AUTO: 9.1 % (ref 5–12)
NEUTROPHILS NFR BLD AUTO: 10.67 10*3/MM3 (ref 1.7–7)
NEUTROPHILS NFR BLD AUTO: 59.7 % (ref 42.7–76)
NEUTROPHILS NFR BLD AUTO: 6.2 10*3/MM3 (ref 1.7–7)
NEUTROPHILS NFR BLD AUTO: 86.1 % (ref 42.7–76)
NITRITE UR QL STRIP: NEGATIVE
NRBC BLD AUTO-RTO: 0 /100 WBC (ref 0–0.2)
NRBC BLD AUTO-RTO: 0 /100 WBC (ref 0–0.2)
NT-PROBNP SERPL-MCNC: 172 PG/ML (ref 0–900)
PH UR STRIP.AUTO: 6.5 [PH] (ref 5–8)
PLATELET # BLD AUTO: 246 10*3/MM3 (ref 140–450)
PLATELET # BLD AUTO: 305 10*3/MM3 (ref 140–450)
PMV BLD AUTO: 11.7 FL (ref 6–12)
PMV BLD AUTO: 12.1 FL (ref 6–12)
POTASSIUM SERPL-SCNC: 3.5 MMOL/L (ref 3.5–5.2)
POTASSIUM SERPL-SCNC: 4.3 MMOL/L (ref 3.5–5.2)
PROT SERPL-MCNC: 6.4 G/DL (ref 6–8.5)
PROT UR QL STRIP: NEGATIVE
QT INTERVAL: 410 MS
RBC # BLD AUTO: 5.55 10*6/MM3 (ref 4.14–5.8)
RBC # BLD AUTO: 5.61 10*6/MM3 (ref 4.14–5.8)
SARS-COV-2 RNA RESP QL NAA+PROBE: NOT DETECTED
SODIUM SERPL-SCNC: 136 MMOL/L (ref 136–145)
SODIUM SERPL-SCNC: 138 MMOL/L (ref 136–145)
SP GR UR STRIP: 1.02 (ref 1–1.03)
TROPONIN T SERPL-MCNC: <0.01 NG/ML (ref 0–0.03)
TROPONIN T SERPL-MCNC: <0.01 NG/ML (ref 0–0.03)
URATE SERPL-MCNC: 4.7 MG/DL (ref 3.4–7)
UROBILINOGEN UR QL STRIP: ABNORMAL
WBC NRBC COR # BLD: 10.38 10*3/MM3 (ref 3.4–10.8)
WBC NRBC COR # BLD: 12.39 10*3/MM3 (ref 3.4–10.8)

## 2022-11-18 PROCEDURE — U0003 INFECTIOUS AGENT DETECTION BY NUCLEIC ACID (DNA OR RNA); SEVERE ACUTE RESPIRATORY SYNDROME CORONAVIRUS 2 (SARS-COV-2) (CORONAVIRUS DISEASE [COVID-19]), AMPLIFIED PROBE TECHNIQUE, MAKING USE OF HIGH THROUGHPUT TECHNOLOGIES AS DESCRIBED BY CMS-2020-01-R: HCPCS | Performed by: INTERNAL MEDICINE

## 2022-11-18 PROCEDURE — 72125 CT NECK SPINE W/O DYE: CPT

## 2022-11-18 PROCEDURE — A9270 NON-COVERED ITEM OR SERVICE: HCPCS | Performed by: NURSE PRACTITIONER

## 2022-11-18 PROCEDURE — 82962 GLUCOSE BLOOD TEST: CPT

## 2022-11-18 PROCEDURE — 93005 ELECTROCARDIOGRAM TRACING: CPT | Performed by: EMERGENCY MEDICINE

## 2022-11-18 PROCEDURE — 63710000001 ESCITALOPRAM 20 MG TABLET: Performed by: NURSE PRACTITIONER

## 2022-11-18 PROCEDURE — 25010000002 MORPHINE PER 10 MG: Performed by: NURSE PRACTITIONER

## 2022-11-18 PROCEDURE — 63710000001 AMLODIPINE 10 MG TABLET 1,000 EACH BOTTLE: Performed by: NURSE PRACTITIONER

## 2022-11-18 PROCEDURE — 84484 ASSAY OF TROPONIN QUANT: CPT | Performed by: EMERGENCY MEDICINE

## 2022-11-18 PROCEDURE — 85652 RBC SED RATE AUTOMATED: CPT | Performed by: EMERGENCY MEDICINE

## 2022-11-18 PROCEDURE — G0378 HOSPITAL OBSERVATION PER HR: HCPCS

## 2022-11-18 PROCEDURE — 80053 COMPREHEN METABOLIC PANEL: CPT | Performed by: EMERGENCY MEDICINE

## 2022-11-18 PROCEDURE — 73610 X-RAY EXAM OF ANKLE: CPT

## 2022-11-18 PROCEDURE — 96374 THER/PROPH/DIAG INJ IV PUSH: CPT

## 2022-11-18 PROCEDURE — 25010000002 MORPHINE PER 10 MG: Performed by: EMERGENCY MEDICINE

## 2022-11-18 PROCEDURE — 84550 ASSAY OF BLOOD/URIC ACID: CPT | Performed by: NURSE PRACTITIONER

## 2022-11-18 PROCEDURE — 63710000001 LISINOPRIL 40 MG TABLET 1,000 EACH BOTTLE: Performed by: NURSE PRACTITIONER

## 2022-11-18 PROCEDURE — 83036 HEMOGLOBIN GLYCOSYLATED A1C: CPT | Performed by: NURSE PRACTITIONER

## 2022-11-18 PROCEDURE — 83735 ASSAY OF MAGNESIUM: CPT | Performed by: EMERGENCY MEDICINE

## 2022-11-18 PROCEDURE — 85025 COMPLETE CBC W/AUTO DIFF WBC: CPT | Performed by: EMERGENCY MEDICINE

## 2022-11-18 PROCEDURE — 25010000002 HEPARIN (PORCINE) PER 1000 UNITS: Performed by: HOSPITALIST

## 2022-11-18 PROCEDURE — C9803 HOPD COVID-19 SPEC COLLECT: HCPCS

## 2022-11-18 PROCEDURE — U0005 INFEC AGEN DETEC AMPLI PROBE: HCPCS | Performed by: INTERNAL MEDICINE

## 2022-11-18 PROCEDURE — 85025 COMPLETE CBC W/AUTO DIFF WBC: CPT | Performed by: HOSPITALIST

## 2022-11-18 PROCEDURE — 25010000002 ONDANSETRON PER 1 MG: Performed by: EMERGENCY MEDICINE

## 2022-11-18 PROCEDURE — 93010 ELECTROCARDIOGRAM REPORT: CPT | Performed by: INTERNAL MEDICINE

## 2022-11-18 PROCEDURE — 63710000001 HYDROCODONE-ACETAMINOPHEN 7.5-325 MG TABLET: Performed by: NURSE PRACTITIONER

## 2022-11-18 PROCEDURE — 83880 ASSAY OF NATRIURETIC PEPTIDE: CPT | Performed by: NURSE PRACTITIONER

## 2022-11-18 PROCEDURE — 81003 URINALYSIS AUTO W/O SCOPE: CPT | Performed by: EMERGENCY MEDICINE

## 2022-11-18 PROCEDURE — 97530 THERAPEUTIC ACTIVITIES: CPT

## 2022-11-18 PROCEDURE — 73630 X-RAY EXAM OF FOOT: CPT

## 2022-11-18 PROCEDURE — 96375 TX/PRO/DX INJ NEW DRUG ADDON: CPT

## 2022-11-18 PROCEDURE — 96376 TX/PRO/DX INJ SAME DRUG ADON: CPT

## 2022-11-18 PROCEDURE — 97161 PT EVAL LOW COMPLEX 20 MIN: CPT

## 2022-11-18 PROCEDURE — 86140 C-REACTIVE PROTEIN: CPT | Performed by: EMERGENCY MEDICINE

## 2022-11-18 PROCEDURE — 82550 ASSAY OF CK (CPK): CPT | Performed by: EMERGENCY MEDICINE

## 2022-11-18 PROCEDURE — 71045 X-RAY EXAM CHEST 1 VIEW: CPT

## 2022-11-18 PROCEDURE — 96372 THER/PROPH/DIAG INJ SC/IM: CPT

## 2022-11-18 PROCEDURE — 63710000001 DIAZEPAM 2 MG TABLET: Performed by: NURSE PRACTITIONER

## 2022-11-18 RX ORDER — SODIUM CHLORIDE 0.9 % (FLUSH) 0.9 %
10 SYRINGE (ML) INJECTION EVERY 12 HOURS SCHEDULED
Status: DISCONTINUED | OUTPATIENT
Start: 2022-11-18 | End: 2022-11-18 | Stop reason: HOSPADM

## 2022-11-18 RX ORDER — DEXTROSE MONOHYDRATE 25 G/50ML
25 INJECTION, SOLUTION INTRAVENOUS
Status: DISCONTINUED | OUTPATIENT
Start: 2022-11-18 | End: 2022-11-18 | Stop reason: HOSPADM

## 2022-11-18 RX ORDER — DIAZEPAM 2 MG/1
2 TABLET ORAL EVERY 8 HOURS PRN
Status: DISCONTINUED | OUTPATIENT
Start: 2022-11-18 | End: 2022-11-18 | Stop reason: HOSPADM

## 2022-11-18 RX ORDER — DEXTROSE MONOHYDRATE 25 G/50ML
25 INJECTION, SOLUTION INTRAVENOUS ONCE
Status: COMPLETED | OUTPATIENT
Start: 2022-11-18 | End: 2022-11-18

## 2022-11-18 RX ORDER — ESCITALOPRAM OXALATE 20 MG/1
20 TABLET ORAL DAILY
Status: DISCONTINUED | OUTPATIENT
Start: 2022-11-18 | End: 2022-11-18 | Stop reason: HOSPADM

## 2022-11-18 RX ORDER — HYDROCODONE BITARTRATE AND ACETAMINOPHEN 7.5; 325 MG/1; MG/1
1 TABLET ORAL EVERY 4 HOURS PRN
Qty: 18 TABLET | Refills: 0 | Status: SHIPPED | OUTPATIENT
Start: 2022-11-18 | End: 2022-11-21

## 2022-11-18 RX ORDER — MORPHINE SULFATE 2 MG/ML
2 INJECTION, SOLUTION INTRAMUSCULAR; INTRAVENOUS ONCE
Status: COMPLETED | OUTPATIENT
Start: 2022-11-18 | End: 2022-11-18

## 2022-11-18 RX ORDER — INSULIN LISPRO 100 [IU]/ML
0-14 INJECTION, SOLUTION INTRAVENOUS; SUBCUTANEOUS
Status: DISCONTINUED | OUTPATIENT
Start: 2022-11-18 | End: 2022-11-18

## 2022-11-18 RX ORDER — SODIUM CHLORIDE 0.9 % (FLUSH) 0.9 %
10 SYRINGE (ML) INJECTION AS NEEDED
Status: DISCONTINUED | OUTPATIENT
Start: 2022-11-18 | End: 2022-11-18 | Stop reason: HOSPADM

## 2022-11-18 RX ORDER — INSULIN LISPRO 100 [IU]/ML
0-9 INJECTION, SOLUTION INTRAVENOUS; SUBCUTANEOUS
Status: DISCONTINUED | OUTPATIENT
Start: 2022-11-18 | End: 2022-11-18 | Stop reason: HOSPADM

## 2022-11-18 RX ORDER — HYDROCODONE BITARTRATE AND ACETAMINOPHEN 7.5; 325 MG/1; MG/1
1 TABLET ORAL EVERY 6 HOURS PRN
Status: DISCONTINUED | OUTPATIENT
Start: 2022-11-18 | End: 2022-11-18 | Stop reason: HOSPADM

## 2022-11-18 RX ORDER — HEPARIN SODIUM 5000 [USP'U]/ML
5000 INJECTION, SOLUTION INTRAVENOUS; SUBCUTANEOUS EVERY 12 HOURS SCHEDULED
Status: DISCONTINUED | OUTPATIENT
Start: 2022-11-18 | End: 2022-11-18 | Stop reason: HOSPADM

## 2022-11-18 RX ORDER — MORPHINE SULFATE 2 MG/ML
2 INJECTION, SOLUTION INTRAMUSCULAR; INTRAVENOUS
Status: DISCONTINUED | OUTPATIENT
Start: 2022-11-18 | End: 2022-11-18 | Stop reason: HOSPADM

## 2022-11-18 RX ORDER — ONDANSETRON 2 MG/ML
4 INJECTION INTRAMUSCULAR; INTRAVENOUS ONCE
Status: COMPLETED | OUTPATIENT
Start: 2022-11-18 | End: 2022-11-18

## 2022-11-18 RX ORDER — NICOTINE POLACRILEX 4 MG
15 LOZENGE BUCCAL
Status: DISCONTINUED | OUTPATIENT
Start: 2022-11-18 | End: 2022-11-18 | Stop reason: HOSPADM

## 2022-11-18 RX ORDER — MORPHINE SULFATE 2 MG/ML
4 INJECTION, SOLUTION INTRAMUSCULAR; INTRAVENOUS ONCE
Status: COMPLETED | OUTPATIENT
Start: 2022-11-18 | End: 2022-11-18

## 2022-11-18 RX ADMIN — ESCITALOPRAM 20 MG: 20 TABLET, FILM COATED ORAL at 12:14

## 2022-11-18 RX ADMIN — DEXTROSE MONOHYDRATE 25 G: 25 INJECTION, SOLUTION INTRAVENOUS at 02:08

## 2022-11-18 RX ADMIN — Medication 10 ML: at 10:48

## 2022-11-18 RX ADMIN — HEPARIN SODIUM 5000 UNITS: 5000 INJECTION, SOLUTION INTRAVENOUS; SUBCUTANEOUS at 03:48

## 2022-11-18 RX ADMIN — MORPHINE SULFATE 4 MG: 2 INJECTION, SOLUTION INTRAMUSCULAR; INTRAVENOUS at 02:51

## 2022-11-18 RX ADMIN — Medication 10 ML: at 03:20

## 2022-11-18 RX ADMIN — MORPHINE SULFATE 2 MG: 2 INJECTION, SOLUTION INTRAMUSCULAR; INTRAVENOUS at 01:10

## 2022-11-18 RX ADMIN — HYDROCODONE BITARTRATE AND ACETAMINOPHEN 1 TABLET: 7.5; 325 TABLET ORAL at 14:26

## 2022-11-18 RX ADMIN — MORPHINE SULFATE 2 MG: 2 INJECTION, SOLUTION INTRAMUSCULAR; INTRAVENOUS at 06:04

## 2022-11-18 RX ADMIN — ONDANSETRON 4 MG: 2 INJECTION INTRAMUSCULAR; INTRAVENOUS at 01:10

## 2022-11-18 RX ADMIN — HYDROCODONE BITARTRATE AND ACETAMINOPHEN 1 TABLET: 7.5; 325 TABLET ORAL at 09:54

## 2022-11-18 RX ADMIN — DIAZEPAM 2 MG: 2 TABLET ORAL at 12:14

## 2022-11-18 RX ADMIN — DEXTROSE MONOHYDRATE 25 G: 25 INJECTION, SOLUTION INTRAVENOUS at 04:14

## 2022-11-18 RX ADMIN — AMLODIPINE BESYLATE: 10 TABLET ORAL at 12:14

## 2022-11-18 NOTE — NURSING NOTE
I spoke with Dr. Robby Villar and he said he consulted with his partner Dr. Mcdaniel. He recommended the patient be put in a boot and be WBAT. Pt is to follow-up with Dr. Mcdaniel or his ARNP in 2-3 weeks. He said there is no need for him to see the patient at this time.    Dr Villar confirmed that it is ok to leave the cast in place

## 2022-11-18 NOTE — ED NOTES
Nursing report ED to floor  Isaiah Bruno  72 y.o.  male    HPI :   Chief Complaint   Patient presents with    Headache    Fall       Admitting doctor:   Marcos Jennings MD    Admitting diagnosis:   The primary encounter diagnosis was Multiple falls. Diagnoses of Closed fracture of right ankle, initial encounter, Arthritis, and Inability to ambulate due to ankle or foot were also pertinent to this visit.    Code status:   Current Code Status       Date Active Code Status Order ID Comments User Context       11/18/2022 0258 CPR (Attempt to Resuscitate) [id number]  Poly Montez APRN ED        Question Answer    Code Status (Patient has no pulse and is not breathing) CPR (Attempt to Resuscitate)    Medical Interventions (Patient has pulse or is breathing) Full Support                    Allergies:   Patient has no known allergies.    Isolation:   No active isolations    Intake and Output  No intake or output data in the 24 hours ending 11/18/22 0737    Weight:   There were no vitals filed for this visit.    Most recent vitals:   Vitals:    11/18/22 0546 11/18/22 0616 11/18/22 0646 11/18/22 0716   BP: 138/79 130/64 128/63 137/68   Pulse: 72 67 63 58   Resp:       Temp:       TempSrc:       SpO2: 96% 94% 92% 93%   Height:           Active LDAs/IV Access:   Lines, Drains & Airways       Active LDAs       Name Placement date Placement time Site Days    Peripheral IV 11/18/22 0059 Right Antecubital 11/18/22 0059  Antecubital  less than 1                    Labs (abnormal labs have a star):   Labs Reviewed   COMPREHENSIVE METABOLIC PANEL - Abnormal; Notable for the following components:       Result Value    Glucose 38 (*)     All other components within normal limits    Narrative:     GFR Normal >60  Chronic Kidney Disease <60  Kidney Failure <15    The GFR formula is only valid for adults with stable renal function between ages 18 and 70.   URINALYSIS W/ MICROSCOPIC IF INDICATED (NO CULTURE) - Abnormal;  Notable for the following components:    Glucose,  mg/dL (2+) (*)     All other components within normal limits    Narrative:     Urine microscopic not indicated.   CBC WITH AUTO DIFFERENTIAL - Abnormal; Notable for the following components:    MPV 12.1 (*)     Monocytes, Absolute 0.94 (*)     All other components within normal limits   CBC WITH AUTO DIFFERENTIAL - Abnormal; Notable for the following components:    WBC 12.39 (*)     Neutrophil % 86.1 (*)     Lymphocyte % 7.3 (*)     Eosinophil % 0.1 (*)     Neutrophils, Absolute 10.67 (*)     All other components within normal limits   BASIC METABOLIC PANEL - Abnormal; Notable for the following components:    Glucose 170 (*)     All other components within normal limits    Narrative:     GFR Normal >60  Chronic Kidney Disease <60  Kidney Failure <15    The GFR formula is only valid for adults with stable renal function between ages 18 and 70.   POCT GLUCOSE FINGERSTICK - Abnormal; Notable for the following components:    Glucose 47 (*)     All other components within normal limits   CK - Normal   MAGNESIUM - Normal   SEDIMENTATION RATE - Normal   C-REACTIVE PROTEIN - Normal   TROPONIN (IN-HOUSE) - Normal    Narrative:     Troponin T Reference Range:  <= 0.03 ng/mL-   Negative for AMI  >0.03 ng/mL-     Abnormal for myocardial necrosis.  Clinicians would have to utilize clinical acumen, EKG, Troponin and serial changes to determine if it is an Acute Myocardial Infarction or myocardial injury due to an underlying chronic condition.       Results may be falsely decreased if patient taking Biotin.     TROPONIN (IN-HOUSE) - Normal    Narrative:     Troponin T Reference Range:  <= 0.03 ng/mL-   Negative for AMI  >0.03 ng/mL-     Abnormal for myocardial necrosis.  Clinicians would have to utilize clinical acumen, EKG, Troponin and serial changes to determine if it is an Acute Myocardial Infarction or myocardial injury due to an underlying chronic condition.        Results may be falsely decreased if patient taking Biotin.     POCT GLUCOSE FINGERSTICK - Normal   POCT GLUCOSE FINGERSTICK   POCT GLUCOSE FINGERSTICK   POCT GLUCOSE FINGERSTICK   POCT GLUCOSE FINGERSTICK   CBC AND DIFFERENTIAL    Narrative:     The following orders were created for panel order CBC & Differential.  Procedure                               Abnormality         Status                     ---------                               -----------         ------                     CBC Auto Differential[[id number]]        Abnormal            Final result                 Please view results for these tests on the individual orders.       EKG:   ECG 12 Lead Chest Pain   Preliminary Result   HEART RATE= 62  bpm   RR Interval= 968  ms   AR Interval= 78  ms   P Horizontal Axis= 67  deg   P Front Axis= 89  deg   QRSD Interval= 104  ms   QT Interval= 410  ms   QRS Axis= -34  deg   T Wave Axis= 123  deg   - ABNORMAL ECG -   Sinus rhythm   Short AR interval   Left axis deviation   Abnormal T, consider ischemia, lateral leads   Electronically Signed By:    Date and Time of Study: 2022-11-18 03:59:16          Meds given in ED:   Medications   sodium chloride 0.9 % flush 10 mL (has no administration in time range)   sodium chloride 0.9 % flush 10 mL (10 mL Intravenous Given 11/18/22 0320)   sodium chloride 0.9 % flush 10 mL (has no administration in time range)   heparin (porcine) 5000 UNIT/ML injection 5,000 Units (5,000 Units Subcutaneous Given 11/18/22 0348)   dextrose (GLUTOSE) oral gel 15 g (has no administration in time range)   dextrose (D50W) (25 g/50 mL) IV injection 25 g (has no administration in time range)   glucagon (human recombinant) (GLUCAGEN DIAGNOSTIC) injection 1 mg (has no administration in time range)   insulin lispro (ADMELOG) injection 0-14 Units (has no administration in time range)   morphine injection 2 mg (2 mg Intravenous Given 11/18/22 0604)   morphine injection 2 mg (2 mg Intravenous Given  11/18/22 0110)   ondansetron (ZOFRAN) injection 4 mg (4 mg Intravenous Given 11/18/22 0110)   dextrose (D50W) (25 g/50 mL) IV injection 25 g (25 g Intravenous Given 11/18/22 0208)   morphine injection 4 mg (4 mg Intravenous Given 11/18/22 0251)   dextrose (D50W) (25 g/50 mL) IV injection 25 g (25 g Intravenous Given 11/18/22 0414)       Imaging results:  XR Ankle 3+ View Right    Result Date: 11/18/2022  Comminuted fracture of the distal fibular diametaphysis.  This report was finalized on 11/18/2022 1:30 AM by Dr. Renetta Long M.D.      CT Head Without Contrast    Result Date: 11/18/2022  No acute intracranial findings.  Radiation dose reduction techniques were utilized, including automated exposure control and exposure modulation based on body size.  This report was finalized on 11/18/2022 12:15 AM by Dr. Renetta Long M.D.      CT Cervical Spine Without Contrast    Result Date: 11/18/2022  No acute fracture or subluxation identified.  Radiation dose reduction techniques were utilized, including automated exposure control and exposure modulation based on body size.  This report was finalized on 11/18/2022 2:16 AM by Dr. Renetta Long M.D.      XR Chest 1 View    Result Date: 11/18/2022   1. Cardiomegaly with possible vascular congestion. 2. Diminished lung volumes with bibasilar atelectasis.  This report was finalized on 11/18/2022 4:00 AM by Dr. Renetta Long M.D.      XR Foot 3+ View Bilateral    Result Date: 11/18/2022  Comminuted fracture of the distal fibular diametaphysis.  This report was finalized on 11/18/2022 1:30 AM by Dr. Renetta Long M.D.       Ambulatory status:   -      Social issues:   Social History     Socioeconomic History    Marital status:    Tobacco Use    Smoking status: Every Day    Smokeless tobacco: Never   Substance and Sexual Activity    Alcohol use: No    Drug use: Never              Sofya Mott RN  11/18/22 07:37 EST

## 2022-11-18 NOTE — ED NOTES
Pt to ED from home via pv. Pt c/o headache in the back of head. Pt had fall 2 days ago and hit head. Pt not blood thinners. Pt denies LOC. Pt has had multiple fall over past month.

## 2022-11-18 NOTE — H&P
"    Patient Name:  Isaiah Bruno  YOB: 1950  MRN:  [id number]  Admit Date:  11/18/2022  Patient Care Team:  Patti Rees MD as PCP - General (Family Medicine)      Subjective   History Present Illness     Chief Complaint   Patient presents with   • Headache   • Fall       Mr. Bruno is a 72 y.o. smoker with a history of DM2 poorly controlled with peripheral neuropathy, HTN, obesity, chronic pain, osteoarthritis.  He presents with multiple falls at home over the last several days.  He states that it is from his chronic pain, neuropathy and arthritis.  The family brought him in when he complained of bilateral lower extremity pain and for a headache.  He states that he did hit his head on 1 fall but did not lose consciousness.  He is not on blood thinners.  He denies falls associated with lightheadedness, near-syncope, dizziness, neurologic deficits, chest pain, shortness of breath or palpitations.  When he falls its that his legs give out,  get weak or he trips.  He has chronic pain from osteoarthritis.  He has increased pain in his right ankle with limited range of motion and is not able to bear weight on the right lower extremity.  When asked what he uses for pain control he says multiple Tylenol and aspirin.  He states,\"I will take up to 8 Tylenol a time and 1 time I took 21 aspirin but it did not help the pain.\"     History of Present Illness  Review of Systems   Constitutional: Positive for activity change. Negative for appetite change and fatigue.   HENT: Negative for congestion and trouble swallowing.    Eyes: Negative for visual disturbance.   Respiratory: Positive for cough (chronic smoker ) and shortness of breath. Negative for choking, chest tightness, wheezing and stridor.    Cardiovascular: Negative for chest pain.   Gastrointestinal: Negative for abdominal distention, abdominal pain, blood in stool, constipation, diarrhea, nausea and vomiting.   Genitourinary: Negative for " dysuria.   Musculoskeletal: Positive for arthralgias, gait problem, joint swelling and myalgias. Negative for back pain, neck pain and neck stiffness.   Skin: Negative for pallor.   Neurological: Positive for headaches. Negative for dizziness, tremors, syncope, speech difficulty and weakness.   Psychiatric/Behavioral: Negative for agitation and confusion.        Personal History     Past Medical History:   Diagnosis Date   • Anxiety    • Diabetes mellitus (CMS/HCC)    • Hypertension    • Osteoarthritis    • Premature ventricular contractions      Past Surgical History:   Procedure Laterality Date   • CATARACT EXTRACTION     • EYE CAPSULOTOMY WITH LASER     • FINGER TENDON REPAIR      right ring finger   • HIATAL HERNIA REPAIR  1978   • KNEE ARTHROSCOPY       Family History   Problem Relation Age of Onset   • Stroke Mother    • Diabetes Mother    • Anxiety disorder Mother    • No Known Problems Father      Social History     Tobacco Use   • Smoking status: Every Day   • Smokeless tobacco: Never   Substance Use Topics   • Alcohol use: No   • Drug use: Never     No current facility-administered medications on file prior to encounter.     Current Outpatient Medications on File Prior to Encounter   Medication Sig Dispense Refill   • amLODIPine-benazepril (LOTREL) 10-40 MG per capsule Take 1 capsule by mouth Daily.     • Cholecalciferol 50 MCG (2000 UT) tablet Take  by mouth.     • escitalopram (LEXAPRO) 10 MG tablet 20 mg Daily.     • hydrochlorothiazide (HYDRODIURIL) 12.5 MG tablet Take 1 tablet by mouth daily. TK 1 T PO QD 90 tablet 3   • insulin aspart protamine & aspart (NOVOLOG) 70/30 100 unit/mL Inject 45 units under the skin into the appropriate area as directed. (Patient taking differently: Inject 45 units under the skin into the appropriate area as directed.    Pt will sometimes take more or less depending on what BG is.) 3 mL 6   • Lutein 10 MG tablet Take  by mouth.     • meloxicam (Mobic) 15 MG tablet Take 1  tablet by mouth Daily. 90 tablet 1   • PARoxetine (PAXIL) 20 MG tablet Take 20 mg by mouth Daily.     • Valium 5 MG tablet Take 1 tablet by mouth Every 8 (Eight) Hours As Needed for Anxiety. 270 tablet 0     No Known Allergies    Objective    Objective     Vital Signs  Temp:  [98 °F (36.7 °C)] 98 °F (36.7 °C)  Heart Rate:  [55-74] 74  Resp:  [18-24] 24  BP: (104-153)/(57-82) 153/82  SpO2:  [91 %-97 %] 97 %  on   ;      Body mass index is 30.77 kg/m².    Physical Exam  Vitals and nursing note reviewed.   Constitutional:       Appearance: He is well-developed. He is obese. He is ill-appearing (uncomfortable but not acutely ill).   HENT:      Head: Normocephalic and atraumatic.   Eyes:      Conjunctiva/sclera: Conjunctivae normal.   Neck:      Trachea: No tracheal deviation.   Cardiovascular:      Rate and Rhythm: Normal rate and regular rhythm.   Pulmonary:      Effort: Pulmonary effort is normal. No respiratory distress.      Breath sounds: Normal breath sounds. No wheezing, rhonchi or rales.   Abdominal:      General: Bowel sounds are normal. There is no distension.      Palpations: Abdomen is soft. There is no mass.      Tenderness: There is no abdominal tenderness. There is no guarding or rebound.   Musculoskeletal:         General: Normal range of motion.      Cervical back: Normal range of motion.   Skin:     General: Skin is warm and dry.   Neurological:      Mental Status: He is alert and oriented to person, place, and time.   Psychiatric:         Judgment: Judgment normal.         Results Review:  I reviewed the patient's new clinical results.  I reviewed the patient's new imaging results and agree with the interpretation.  I reviewed the patient's other test results and agree with the interpretation  I personally viewed and interpreted the patient's EKG/Telemetry data  Discussed with ED provider.    Lab Results (last 24 hours)     Procedure Component Value Units Date/Time    CBC & Differential [[id number]]   (Abnormal) Collected: 11/18/22 0059    Specimen: Blood Updated: 11/18/22 0118    Narrative:      The following orders were created for panel order CBC & Differential.  Procedure                               Abnormality         Status                     ---------                               -----------         ------                     CBC Auto Differential[[id number]]        Abnormal            Final result                 Please view results for these tests on the individual orders.    Comprehensive Metabolic Panel [[id number]]  (Abnormal) Collected: 11/18/22 0059    Specimen: Blood Updated: 11/18/22 0154     Glucose 38 mg/dL      BUN 15 mg/dL      Creatinine 0.93 mg/dL      Sodium 138 mmol/L      Potassium 3.5 mmol/L      Chloride 103 mmol/L      CO2 25.0 mmol/L      Calcium 9.0 mg/dL      Total Protein 6.4 g/dL      Albumin 4.10 g/dL      ALT (SGPT) 12 U/L      AST (SGOT) 16 U/L      Alkaline Phosphatase 84 U/L      Total Bilirubin 0.2 mg/dL      Globulin 2.3 gm/dL      A/G Ratio 1.8 g/dL      BUN/Creatinine Ratio 16.1     Anion Gap 10.0 mmol/L      eGFR 86.2 mL/min/1.73      Comment: National Kidney Foundation and American Society of Nephrology (ASN) Task Force recommended calculation based on the Chronic Kidney Disease Epidemiology Collaboration (CKD-EPI) equation refit without adjustment for race.       Narrative:      GFR Normal >60  Chronic Kidney Disease <60  Kidney Failure <15    The GFR formula is only valid for adults with stable renal function between ages 18 and 70.    CK [[id number]]  (Normal) Collected: 11/18/22 0059    Specimen: Blood Updated: 11/18/22 0129     Creatine Kinase 53 U/L     Magnesium [[id number]]  (Normal) Collected: 11/18/22 0059    Specimen: Blood Updated: 11/18/22 0129     Magnesium 2.0 mg/dL     Sedimentation Rate [[id number]]  (Normal) Collected: 11/18/22 0059    Specimen: Blood Updated: 11/18/22 0131     Sed Rate 12 mm/hr     C-reactive Protein [[id number]]  (Normal) Collected:  11/18/22 0059    Specimen: Blood Updated: 11/18/22 0129     C-Reactive Protein <0.30 mg/dL     CBC Auto Differential [[id number]]  (Abnormal) Collected: 11/18/22 0059    Specimen: Blood Updated: 11/18/22 0118     WBC 10.38 10*3/mm3      RBC 5.55 10*6/mm3      Hemoglobin 15.8 g/dL      Hematocrit 49.4 %      MCV 89.0 fL      MCH 28.5 pg      MCHC 32.0 g/dL      RDW 13.2 %      RDW-SD 43.5 fl      MPV 12.1 fL      Platelets 305 10*3/mm3      Neutrophil % 59.7 %      Lymphocyte % 28.2 %      Monocyte % 9.1 %      Eosinophil % 1.8 %      Basophil % 0.7 %      Immature Grans % 0.5 %      Neutrophils, Absolute 6.20 10*3/mm3      Lymphocytes, Absolute 2.93 10*3/mm3      Monocytes, Absolute 0.94 10*3/mm3      Eosinophils, Absolute 0.19 10*3/mm3      Basophils, Absolute 0.07 10*3/mm3      Immature Grans, Absolute 0.05 10*3/mm3      nRBC 0.0 /100 WBC     Troponin [[id number]]  (Normal) Collected: 11/18/22 0059    Specimen: Blood Updated: 11/18/22 0410     Troponin T <0.010 ng/mL     Narrative:      Troponin T Reference Range:  <= 0.03 ng/mL-   Negative for AMI  >0.03 ng/mL-     Abnormal for myocardial necrosis.  Clinicians would have to utilize clinical acumen, EKG, Troponin and serial changes to determine if it is an Acute Myocardial Infarction or myocardial injury due to an underlying chronic condition.       Results may be falsely decreased if patient taking Biotin.      Urinalysis With Microscopic If Indicated (No Culture) - Urine, Clean Catch [[id number]]  (Abnormal) Collected: 11/18/22 0215    Specimen: Urine, Clean Catch Updated: 11/18/22 0225     Color, UA Yellow     Appearance, UA Clear     pH, UA 6.5     Specific Gravity, UA 1.017     Glucose,  mg/dL (2+)     Ketones, UA Negative     Bilirubin, UA Negative     Blood, UA Negative     Protein, UA Negative     Leuk Esterase, UA Negative     Nitrite, UA Negative     Urobilinogen, UA 1.0 E.U./dL    Narrative:      Urine microscopic not indicated.    POC Glucose Once  [[id number]]  (Normal) Collected: 11/18/22 0225    Specimen: Blood Updated: 11/18/22 0235     Glucose 129 mg/dL      Comment: Meter: [id number] : [id number] Isidra Zepeda Boni       POC Glucose Once [[id number]]  (Abnormal) Collected: 11/18/22 0409    Specimen: Blood Updated: 11/18/22 0414     Glucose 47 mg/dL      Comment: Result Not Confirmed Meter: [id number] : [id number] Isidra Zepeda Boni       CBC Auto Differential [[id number]]  (Abnormal) Collected: 11/18/22 0600    Specimen: Blood Updated: 11/18/22 0613     WBC 12.39 10*3/mm3      RBC 5.61 10*6/mm3      Hemoglobin 16.2 g/dL      Hematocrit 49.6 %      MCV 88.4 fL      MCH 28.9 pg      MCHC 32.7 g/dL      RDW 13.1 %      RDW-SD 42.4 fl      MPV 11.7 fL      Platelets 246 10*3/mm3      Neutrophil % 86.1 %      Lymphocyte % 7.3 %      Monocyte % 5.9 %      Eosinophil % 0.1 %      Basophil % 0.2 %      Immature Grans % 0.4 %      Neutrophils, Absolute 10.67 10*3/mm3      Lymphocytes, Absolute 0.90 10*3/mm3      Monocytes, Absolute 0.73 10*3/mm3      Eosinophils, Absolute 0.01 10*3/mm3      Basophils, Absolute 0.03 10*3/mm3      Immature Grans, Absolute 0.05 10*3/mm3      nRBC 0.0 /100 WBC     Basic Metabolic Panel [[id number]]  (Abnormal) Collected: 11/18/22 0600    Specimen: Blood Updated: 11/18/22 0635     Glucose 170 mg/dL      BUN 16 mg/dL      Creatinine 0.91 mg/dL      Sodium 136 mmol/L      Potassium 4.3 mmol/L      Chloride 99 mmol/L      CO2 25.1 mmol/L      Calcium 9.0 mg/dL      BUN/Creatinine Ratio 17.6     Anion Gap 11.9 mmol/L      eGFR 88.4 mL/min/1.73      Comment: National Kidney Foundation and American Society of Nephrology (ASN) Task Force recommended calculation based on the Chronic Kidney Disease Epidemiology Collaboration (CKD-EPI) equation refit without adjustment for race.       Narrative:      GFR Normal >60  Chronic Kidney Disease <60  Kidney Failure <15    The GFR formula is only valid for adults with stable renal function between ages  18 and 70.    Troponin [[id number]]  (Normal) Collected: 11/18/22 0600    Specimen: Blood Updated: 11/18/22 0633     Troponin T <0.010 ng/mL     Narrative:      Troponin T Reference Range:  <= 0.03 ng/mL-   Negative for AMI  >0.03 ng/mL-     Abnormal for myocardial necrosis.  Clinicians would have to utilize clinical acumen, EKG, Troponin and serial changes to determine if it is an Acute Myocardial Infarction or myocardial injury due to an underlying chronic condition.       Results may be falsely decreased if patient taking Biotin.            Imaging Results (Last 24 Hours)     Procedure Component Value Units Date/Time    XR Chest 1 View [[id number]] Collected: 11/18/22 0358     Updated: 11/18/22 0403    Narrative:      SINGLE VIEW OF THE CHEST     HISTORY: Chest pain.      COMPARISON: None available.     FINDINGS:  Cardiomegaly is present. There may be some vascular congestion. Lung  volumes are diminished, with bibasilar atelectasis noted. No  pneumothorax or pleural effusion is seen. No displaced rib fractures are  seen.       Impression:         1. Cardiomegaly with possible vascular congestion.  2. Diminished lung volumes with bibasilar atelectasis.     This report was finalized on 11/18/2022 4:00 AM by Dr. Renetta Long M.D.       CT Cervical Spine Without Contrast [[id number]] Collected: 11/18/22 0212     Updated: 11/18/22 0219    Narrative:      CT OF THE CERVICAL SPINE     HISTORY: Fall     COMPARISON: None available.     TECHNIQUE: Axial CT imaging was obtained through the cervical spine.  Coronal and sagittal reformatted images were obtained.     FINDINGS:  No acute fracture or subluxation of the cervical spine is seen.  Intervertebral disc space narrowing is most significant at C3-C4. There  is mild anterolisthesis of C4 on C5 and C5 on C6. There is no  prevertebral soft tissue swelling.     C2-C3: There is mild canal narrowing. There is mild bilateral neural  foraminal narrowing.  C3-C4: There is  moderate canal stenosis. There is severe bilateral  neural foraminal narrowing.  C4-C5: There is moderate canal stenosis. There is severe neural  foraminal narrowing on the left.  C5-C6: There is moderate canal stenosis. There is moderate bilateral  neural foraminal narrowing.  C6-C7: There is moderate canal stenosis. There is mild bilateral neural  foraminal narrowing.  C7-T1: There is moderate canal stenosis. There is moderate neural  foraminal narrowing on the left.       Impression:      No acute fracture or subluxation identified.     Radiation dose reduction techniques were utilized, including automated  exposure control and exposure modulation based on body size.     This report was finalized on 11/18/2022 2:16 AM by Dr. Renetta Long M.D.       XR Ankle 3+ View Right [[id number]] Collected: 11/18/22 0125     Updated: 11/18/22 0133    Narrative:      3 VIEWS RIGHT ANKLE; 3 VIEWS BILATERAL FEET     HISTORY: Fall     COMPARISON: None available.     FINDINGS:  Right ankle: The patient is a comminuted obliquely oriented fracture of  the distal fibular diametaphysis. There may be an old lateral malleolar  fracture. No additional acute fracture is seen. There are dense vascular  calcifications. There is diffuse opacity change seen on the plantar  aspect of the calcaneus, and at the insertion of the Achilles tendon.     Right foot: No acute fracture or subluxation of the right foot is seen.  There are advanced degenerative changes at the metatarsophalangeal joint  of the great toe.     Left foot: No acute fracture or subluxation of the left foot is seen.  Advanced degenerative changes are noted at the metatarsophalangeal joint  of the great toe. There is enthesopathic change at the insertion of the  Achilles tendon, and along the plantar aspect of the calcaneus.       Impression:      Comminuted fracture of the distal fibular diametaphysis.     This report was finalized on 11/18/2022 1:30 AM by Dr. Jackson  JENNA Long.       XR Foot 3+ View Bilateral [[id number]] Collected: 11/18/22 0125     Updated: 11/18/22 0133    Narrative:      3 VIEWS RIGHT ANKLE; 3 VIEWS BILATERAL FEET     HISTORY: Fall     COMPARISON: None available.     FINDINGS:  Right ankle: The patient is a comminuted obliquely oriented fracture of  the distal fibular diametaphysis. There may be an old lateral malleolar  fracture. No additional acute fracture is seen. There are dense vascular  calcifications. There is diffuse opacity change seen on the plantar  aspect of the calcaneus, and at the insertion of the Achilles tendon.     Right foot: No acute fracture or subluxation of the right foot is seen.  There are advanced degenerative changes at the metatarsophalangeal joint  of the great toe.     Left foot: No acute fracture or subluxation of the left foot is seen.  Advanced degenerative changes are noted at the metatarsophalangeal joint  of the great toe. There is enthesopathic change at the insertion of the  Achilles tendon, and along the plantar aspect of the calcaneus.       Impression:      Comminuted fracture of the distal fibular diametaphysis.     This report was finalized on 11/18/2022 1:30 AM by Dr. Renetta Long M.D.       CT Head Without Contrast [[id number]] Collected: 11/18/22 0013     Updated: 11/18/22 0018    Narrative:      CT HEAD WITHOUT CONTRAST     HISTORY: Headache     COMPARISON: None available.     TECHNIQUE: Axial CT imaging was obtained through the brain. No IV  contrast was administered.     FINDINGS:  No acute intracranial hemorrhage is seen. There is diffuse atrophy.  There is periventricular and deep white matter microangiopathic change.  There is no midline shift or mass effect. Old bilateral thalamic  infarcts are noted. No calvarial fracture is seen. Mucosal thickening is  noted within the paranasal sinuses. There does appear to be some  posterior scalp soft tissue swelling near the vertex.       Impression:       No acute intracranial findings.     Radiation dose reduction techniques were utilized, including automated  exposure control and exposure modulation based on body size.     This report was finalized on 11/18/2022 12:15 AM by Dr. Renetta Long M.D.                 ECG 12 Lead Chest Pain   Final Result   HEART RATE= 62  bpm   RR Interval= 968  ms   HI Interval= 78  ms   P Horizontal Axis= 67  deg   P Front Axis= 89  deg   QRSD Interval= 104  ms   QT Interval= 410  ms   QRS Axis= -34  deg   T Wave Axis= 123  deg   - ABNORMAL ECG -   Sinus rhythm   Left axis deviation   Abnormal T, consider ischemia, lateral leads   No Prior Tracing for Comparison   Electronically Signed By: Dorita Quinones ([facility]) 18-Nov-2022 07:53:11   Date and Time of Study: 2022-11-18 03:59:16      I have personally reviewed:  []  Laboratory   []  Microbiology   []  Radiology   []  EKG/Telemetry   []  Cardiology/Vascular   []  Pathology   []  Records  Glucose   Date/Time Value Ref Range Status   11/18/2022 0409 47 (C) 70 - 130 mg/dL Final     Comment:     Result Not Confirmed Meter: [id number] : [id number] Isidra Morales   11/18/2022 0225 129 70 - 130 mg/dL Final     Comment:     Meter: [id number] : [id number] Isidra Morales          Assessment/Plan     Active Hospital Problems    Diagnosis  POA   • **Multiple falls [R29.6]  Not Applicable   • Hypoglycemia [E16.2]  Unknown   • Painful diabetic neuropathy (HCC) [E11.40]  Yes   • TOMMY (generalized anxiety disorder) [F41.1]  Yes   • Type 2 diabetes mellitus with diabetic polyneuropathy, with long-term current use of insulin (HCC) [E11.42, Z79.4]  Not Applicable   • Chronic anxiety [F41.9]  Yes   • Essential hypertension [I10]  Yes   • Osteoarthritis, generalized [M15.9]  Yes      Resolved Hospital Problems   No resolved problems to display.       Mr. Bruno is a 72 y.o. admitted with multiple falls.  CT head without acute intracranial finding except for scalp tissue swelling.   C-spine unremarkable.  Right comminuted fracture of the distal fibular diametaphysis present and patient in splint.    · Comminuted fracture of the distal fibular diametaphysis.  Consult orthopedic surgery.  PT OT.  Falls risk.    · Recurrent falls/arthritic pain  Falls likely secondary to neuropathy, arthritis and chronic pain.  PT OT evaluation.  Oral narcotics for pain control.  Consider rehab placement. Decreased dose of valium as possible contributing factor    · DM2 with peripheral neuropathy  Glucose labile with hypoglycemia.  Glucose ranging from 47-1 70s.  Correctional insulin moderate dose avoid hypoglycemia.  Diabetes educator.  Check A1c.  Hold home agents.    · HTN- continue home BP meds with increased BP    · Vascular congestion on CXR  Not significantly volume overloaded on exam.  Stable on room air.  Check BNP uric acid. consider small dose of lasix.   No echocardiogram in EMR.    · I discussed the patient's findings and my recommendations with patient, nursing staff and Dr Alfred .    VTE Prophylaxis - SCDs.  Code Status - Full code.     RN awaiting spouse to arrive to confirm home meds    EFRAIN Lao  [facility]  11/18/22  10:30 EST     Addendum: RN spoke with Dr. Villar who recommends Ortho boot, follow-up in the office 2 to 3 weeks.  Orthopedic surgery does not need to see in person. await PT OT theresa

## 2022-11-18 NOTE — PLAN OF CARE
Goal Outcome Evaluation:  Plan of Care Reviewed With: patient, spouse           Outcome Evaluation: Patient is a 72 y.o male who presents to [facility] with reports of a headache following a fall 2 days ago. Patient also has closed fracture of R ankle. Per ortho patient WBATon R in splint. Patient and spouse report multiple falls recently. Patient lives at home with his wife with 2 steps to enter and a full flight of steps to bedroom. Patient is typically independent at baseline and occasionally uses a cane. Today patient completed all bed mobility with SBA. Patient performed STS from EOB with CGA. Patient ambulated 25ft with Olegario. Patient gait antaligic with R knee buckling and patient requiring standing rest breaks leaning against wall. Patient appears impulsive at times and demonstrates decreased safety awareness. Patient may continue to benefit from skilled PT intervention to address deficits in functional mobility and maximize safety and independence. PT recommends  SNF at d/c. Will continue to monitor.

## 2022-11-18 NOTE — DISCHARGE SUMMARY
Patient Name: Isaiah Bruno  : 1950  MRN: [id number]    Date of Admission: 2022  Date of Discharge:  2022  Primary Care Physician: Patti Rees MD      Chief Complaint:   Headache and Fall      Discharge Diagnoses     Active Hospital Problems    Diagnosis  POA   • **Multiple falls [R29.6]  Not Applicable   • Painful diabetic neuropathy (HCC) [E11.40]  Yes   • TOMMY (generalized anxiety disorder) [F41.1]  Yes   • Type 2 diabetes mellitus with diabetic polyneuropathy, with long-term current use of insulin (HCC) [E11.42, Z79.4]  Not Applicable   • Chronic anxiety [F41.9]  Yes   • Essential hypertension [I10]  Yes   • Osteoarthritis, generalized [M15.9]  Yes      Resolved Hospital Problems    Diagnosis Date Resolved POA   • Hypoglycemia [E16.2] 2022 Yes        Hospital Course     Mr. Bruno is a 72 y.o. male with a history of chronic pain, arthritis, diabetes mellitus with neuropathy that presents with multiple falls at home.  He has sustained a right comminuted fracture of the distal fibular diametaphysis.  Other work-up was unremarkable including CT head and C-spine.  Imaging and chart reviewed by orthopedic surgery who does not feel the need to see him in person.  He will need to be discharged with a boot to work with PT and follow-up in the office in 2 to 3 weeks patient will need physical therapy and Occupational Therapy.  He is appropriate for discharge to subacute rehab. WBC elevated but likely reactive.        Day of Discharge     Subjective:  He is stable for DC. No new issues     Physical Exam:  Temp:  [98 °F (36.7 °C)-98.2 °F (36.8 °C)] 98.2 °F (36.8 °C)  Heart Rate:  [55-74] 71  Resp:  [18-24] 20  BP: (104-153)/(57-88) 142/88  Body mass index is 30.77 kg/m².  Physical Exam  See H&P  Consultants     Consult Orders (all) (From admission, onward)     Start     Ordered    22 1101  Inpatient Case Management  Consult  Once        Provider:  (Not yet  assigned)    11/18/22 1100    11/18/22 1023  Inpatient Orthopedic Surgery Consult  Once        Specialty:  Orthopedic Surgery  Provider:  Danny Villar MD    11/18/22 1023    11/18/22 0944  Inpatient Diabetes Educator Consult  Once        Provider:  (Not yet assigned)    11/18/22 0944    11/18/22 0911  Inpatient Consult to Advance Care Planning  Once        Provider:  (Not yet assigned)    11/18/22 0910    11/18/22 0258  Inpatient Case Management  Consult  Once        Provider:  (Not yet assigned)    11/18/22 0257    11/18/22 0239  [facility] (on-call MD unless specified) Details  Once        Specialty:  Hospitalist  Provider:  Marcos Jennings MD    11/18/22 0238              Procedures     * Surgery not found *      Imaging Results (All)     Procedure Component Value Units Date/Time    XR Chest 1 View [[id number]] Collected: 11/18/22 0358     Updated: 11/18/22 0403    Narrative:      SINGLE VIEW OF THE CHEST     HISTORY: Chest pain.      COMPARISON: None available.     FINDINGS:  Cardiomegaly is present. There may be some vascular congestion. Lung  volumes are diminished, with bibasilar atelectasis noted. No  pneumothorax or pleural effusion is seen. No displaced rib fractures are  seen.       Impression:         1. Cardiomegaly with possible vascular congestion.  2. Diminished lung volumes with bibasilar atelectasis.     This report was finalized on 11/18/2022 4:00 AM by Dr. Renetta Long M.D.       CT Cervical Spine Without Contrast [[id number]] Collected: 11/18/22 0212     Updated: 11/18/22 0219    Narrative:      CT OF THE CERVICAL SPINE     HISTORY: Fall     COMPARISON: None available.     TECHNIQUE: Axial CT imaging was obtained through the cervical spine.  Coronal and sagittal reformatted images were obtained.     FINDINGS:  No acute fracture or subluxation of the cervical spine is seen.  Intervertebral disc space narrowing is most significant at C3-C4. There  is mild anterolisthesis of  C4 on C5 and C5 on C6. There is no  prevertebral soft tissue swelling.     C2-C3: There is mild canal narrowing. There is mild bilateral neural  foraminal narrowing.  C3-C4: There is moderate canal stenosis. There is severe bilateral  neural foraminal narrowing.  C4-C5: There is moderate canal stenosis. There is severe neural  foraminal narrowing on the left.  C5-C6: There is moderate canal stenosis. There is moderate bilateral  neural foraminal narrowing.  C6-C7: There is moderate canal stenosis. There is mild bilateral neural  foraminal narrowing.  C7-T1: There is moderate canal stenosis. There is moderate neural  foraminal narrowing on the left.       Impression:      No acute fracture or subluxation identified.     Radiation dose reduction techniques were utilized, including automated  exposure control and exposure modulation based on body size.     This report was finalized on 11/18/2022 2:16 AM by Dr. Renetta Long M.D.       XR Ankle 3+ View Right [[id number]] Collected: 11/18/22 0125     Updated: 11/18/22 0133    Narrative:      3 VIEWS RIGHT ANKLE; 3 VIEWS BILATERAL FEET     HISTORY: Fall     COMPARISON: None available.     FINDINGS:  Right ankle: The patient is a comminuted obliquely oriented fracture of  the distal fibular diametaphysis. There may be an old lateral malleolar  fracture. No additional acute fracture is seen. There are dense vascular  calcifications. There is diffuse opacity change seen on the plantar  aspect of the calcaneus, and at the insertion of the Achilles tendon.     Right foot: No acute fracture or subluxation of the right foot is seen.  There are advanced degenerative changes at the metatarsophalangeal joint  of the great toe.     Left foot: No acute fracture or subluxation of the left foot is seen.  Advanced degenerative changes are noted at the metatarsophalangeal joint  of the great toe. There is enthesopathic change at the insertion of the  Achilles tendon, and along the  plantar aspect of the calcaneus.       Impression:      Comminuted fracture of the distal fibular diametaphysis.     This report was finalized on 11/18/2022 1:30 AM by Dr. Renetta Long M.D.       XR Foot 3+ View Bilateral [[id number]] Collected: 11/18/22 0125     Updated: 11/18/22 0133    Narrative:      3 VIEWS RIGHT ANKLE; 3 VIEWS BILATERAL FEET     HISTORY: Fall     COMPARISON: None available.     FINDINGS:  Right ankle: The patient is a comminuted obliquely oriented fracture of  the distal fibular diametaphysis. There may be an old lateral malleolar  fracture. No additional acute fracture is seen. There are dense vascular  calcifications. There is diffuse opacity change seen on the plantar  aspect of the calcaneus, and at the insertion of the Achilles tendon.     Right foot: No acute fracture or subluxation of the right foot is seen.  There are advanced degenerative changes at the metatarsophalangeal joint  of the great toe.     Left foot: No acute fracture or subluxation of the left foot is seen.  Advanced degenerative changes are noted at the metatarsophalangeal joint  of the great toe. There is enthesopathic change at the insertion of the  Achilles tendon, and along the plantar aspect of the calcaneus.       Impression:      Comminuted fracture of the distal fibular diametaphysis.     This report was finalized on 11/18/2022 1:30 AM by Dr. Renetta Long M.D.       CT Head Without Contrast [[id number]] Collected: 11/18/22 0013     Updated: 11/18/22 0018    Narrative:      CT HEAD WITHOUT CONTRAST     HISTORY: Headache     COMPARISON: None available.     TECHNIQUE: Axial CT imaging was obtained through the brain. No IV  contrast was administered.     FINDINGS:  No acute intracranial hemorrhage is seen. There is diffuse atrophy.  There is periventricular and deep white matter microangiopathic change.  There is no midline shift or mass effect. Old bilateral thalamic  infarcts are noted. No calvarial  fracture is seen. Mucosal thickening is  noted within the paranasal sinuses. There does appear to be some  posterior scalp soft tissue swelling near the vertex.       Impression:      No acute intracranial findings.     Radiation dose reduction techniques were utilized, including automated  exposure control and exposure modulation based on body size.     This report was finalized on 11/18/2022 12:15 AM by Dr. Renetta Long M.D.               Pertinent Labs     Results from last 7 days   Lab Units 11/18/22 0600 11/18/22  0059   WBC 10*3/mm3 12.39* 10.38   HEMOGLOBIN g/dL 16.2 15.8   PLATELETS 10*3/mm3 246 305     Results from last 7 days   Lab Units 11/18/22 0600 11/18/22  0059   SODIUM mmol/L 136 138   POTASSIUM mmol/L 4.3 3.5   CHLORIDE mmol/L 99 103   CO2 mmol/L 25.1 25.0   BUN mg/dL 16 15   CREATININE mg/dL 0.91 0.93   GLUCOSE mg/dL 170* 38*   EGFR mL/min/1.73 88.4 86.2     Results from last 7 days   Lab Units 11/18/22  0059   ALBUMIN g/dL 4.10   BILIRUBIN mg/dL 0.2   ALK PHOS U/L 84   AST (SGOT) U/L 16   ALT (SGPT) U/L 12     Results from last 7 days   Lab Units 11/18/22 0600 11/18/22  0059   CALCIUM mg/dL 9.0 9.0   ALBUMIN g/dL  --  4.10   MAGNESIUM mg/dL  --  2.0       Results from last 7 days   Lab Units 11/18/22 0600 11/18/22  0059   CK TOTAL U/L  --  53   TROPONIN T ng/mL <0.010 <0.010   PROBNP pg/mL 172.0  --      Results from last 7 days   Lab Units 11/18/22 0600   URIC ACID mg/dL 4.7         Invalid input(s): LDLCALC      Results from last 7 days   Lab Units 11/18/22  1215   COVID19  Not Detected       Test Results Pending at Discharge       Discharge Details        Discharge Medications      New Medications      Instructions Start Date   HYDROcodone-acetaminophen 7.5-325 MG per tablet  Commonly known as: NORCO   1 tablet, Oral, Every 4 Hours PRN         Changes to Medications      Instructions Start Date   insulin aspart prot & aspart (70-30) 100 UNIT/ML suspension pen-injector  injection  Commonly known as: NOVOLOG  What changed: additional instructions   Inject 45 units under the skin into the appropriate area as directed.         Continue These Medications      Instructions Start Date   amLODIPine-benazepril 10-40 MG per capsule  Commonly known as: LOTREL   1 capsule, Oral, Daily      Cholecalciferol 50 MCG (2000 UT) tablet   Oral      escitalopram 10 MG tablet  Commonly known as: LEXAPRO   20 mg, Daily      hydroCHLOROthiazide 12.5 MG tablet  Commonly known as: HYDRODIURIL   12.5 mg, Oral, Daily, TK 1 T PO QD      Lutein 10 MG tablet   Oral      meloxicam 15 MG tablet  Commonly known as: Mobic   15 mg, Oral, Daily      PARoxetine 20 MG tablet  Commonly known as: PAXIL   20 mg, Oral, Daily      Valium 5 MG tablet  Generic drug: diazePAM   5 mg, Oral, Every 8 Hours PRN             No Known Allergies    Discharge Disposition:   Rehab Facility or Unit (DC - External)      Discharge Diet:  Diet Order   Procedures   • Diet Regular Texture (IDDSI 7); Regular Consistency; Diabetic Diets; Consistent Carbohydrate       Discharge Activity:       CODE STATUS:    Code Status and Medical Interventions:   Ordered at: 11/18/22 0258     Code Status (Patient has no pulse and is not breathing):    CPR (Attempt to Resuscitate)     Medical Interventions (Patient has pulse or is breathing):    Full Support       Future Appointments   Date Time Provider Department Center   12/12/2022  2:00 PM Patti Rees MD [id number]      Follow-up Information     Isaiah Mcdaniel Jr., MD Follow up in 3 week(s).    Specialty: Orthopedic Surgery  Contact information:  [address] [facility] [id number] [phone]             Patti Rees MD .    Specialty: Family Medicine  Contact information:  [address] [facility] [address]  [facility] [id number] [phone]                         Time Spent on Discharge:  Greater than 35 minutes      EFRAIN Lao  [facility]  11/18/22  13:41 EST

## 2022-11-18 NOTE — CASE MANAGEMENT/SOCIAL WORK
Continued Stay Note  [facility]     Patient Name: Isaiah Bruno  MRN: [id number]  Today's Date: 11/18/2022    Admit Date: 11/18/2022    Plan: [facility] for short term rehab   Discharge Plan     Row Name 11/18/22 1136       Plan    Plan [facility] for short term rehab, SNF pharmacy is [facility] (epic updated)    Plan Comments I met with pt and his wife Kaitlin, pt confirmed the address, PCP and [facility] are correct.  Pt said he and his wife live in a multi-level home that has 2 steps to enter, he said his room in the basement. Pt said he is IADL, has a cane but his wife said he seldom uses it.  I ask pt how functional it was prior to his injury, he first said he has been in bed for 3 years, he confirmed he can walk to the bathroom and kitchen, his wife added he also goes outside to sit.  I advised rehab and SNF’s goal will be to help him return to his normal function.  Pt’s wife said his activity has been declining for 6 months and worse the last month.  Pt said he has never had home health, his wife said he had been to SNF 3 years ago, she could not recall the facility.  Pt said his wife provides him with transportation, pt confirmed he can afford his medication. Pt with his wife’s help selected 3 SNF: 1st choice [facility] (voice mail left for [facility]) 2nd choice [facility] (Thor updated, she said she will eval and call me back to advise if she can offer a rehab bed, 3rd choice [facility] (Estrella notified of referral, she said to keep her posted).  Pt said he did not want to select a home health at this time.  Return call from Thor with [facility] advising she can offer him a rehab bed. Pt’s wife updated, she said she can provide him transportation to [facility].   Thor called back to advise pt will need a negative Covid test.    Milana Whitney RN    Row Name 11/18/22 3649       Plan    Plan short term rehab (pending acceptance) 1 choice: [facility]  2nd choice [facility]  3rd choice: [facility] then home with unselected home health    Patient/Family in Agreement with Plan yes    Provided Post Acute Provider List? Yes    Post Acute Provider List Nursing Home;Home Health    Provided Post Acute Provider Quality & Resource List? Yes    Post Acute Provider Quality and Resource List Nursing Home    Delivered To Patient    Method of Delivery In person               Discharge Codes    No documentation.                     Milana Whitney RN

## 2022-11-18 NOTE — ED PROVIDER NOTES
Splint - Cast - Strapping    Date/Time: 11/18/2022 3:42 AM  Performed by: Luciana Gu PA  Authorized by: Marcos Jennings MD     Consent:     Consent obtained:  Verbal    Consent given by:  Patient    Risks, benefits, and alternatives were discussed: yes      Risks discussed:  Discoloration, numbness, pain and swelling  Universal protocol:     Procedure explained and questions answered to patient or proxy's satisfaction: yes      Imaging studies available: yes      Patient identity confirmed:  Arm band  Pre-procedure details:     Distal neurologic exam:  Normal    Distal perfusion: distal pulses strong and brisk capillary refill    Procedure details:     Location:  Ankle    Ankle location:  R ankle    Splint type:  Short leg    Supplies:  Cotton padding, elastic bandage and fiberglass    Attestation: Splint applied and adjusted personally by me    Post-procedure details:     Distal neurologic exam:  Normal    Distal perfusion: distal pulses strong and brisk capillary refill      Procedure completion:  Tolerated well, no immediate complications    Post-procedure imaging: not applicable    Splint - Cast - Strapping    Date/Time: 11/18/2022 3:43 AM  Performed by: Luciana Gu PA  Authorized by: Marcos Jennings MD     Consent:     Consent obtained:  Verbal    Consent given by:  Patient    Risks, benefits, and alternatives were discussed: yes      Risks discussed:  Discoloration, numbness, pain and swelling  Universal protocol:     Procedure explained and questions answered to patient or proxy's satisfaction: yes      Imaging studies available: yes      Patient identity confirmed:  Arm band  Pre-procedure details:     Distal neurologic exam:  Normal    Distal perfusion: distal pulses strong and brisk capillary refill    Procedure details:     Location:  Ankle    Ankle location:  R ankle    Splint type:  Ankle stirrup    Supplies:  Cotton padding, elastic bandage and fiberglass    Attestation: Splint  applied and adjusted personally by me    Post-procedure details:     Distal neurologic exam:  Normal    Distal perfusion: distal pulses strong and brisk capillary refill      Procedure completion:  Tolerated well, no immediate complications    Post-procedure imaging: not applicable             Luciana Gu PA  11/18/22 0347

## 2022-11-18 NOTE — DISCHARGE PLACEMENT REQUEST
"Diaz Bruno (72 y.o. Male)     Date of Birth   1950    Social Security Number       Address   [address] [place] [address]    Home Phone   [phone]    MRN   [id number]       Oriental orthodox   Unknown    Marital Status                               Admission Date   11/18/22    Admission Type   Emergency    Admitting Provider   Marcos Jennings MD    Attending Provider   Carter Alfred MD    Department, Room/Bed   [facility] OBSERVATION, 123/1       Discharge Date       Discharge Disposition       Discharge Destination                               Attending Provider: Carter Alfred MD    Allergies: No Known Allergies    Isolation: None   Infection: None   Code Status: CPR    Ht: 182.9 cm (72\")   Wt: 103 kg (226 lb 14.4 oz)    Admission Cmt: None   Principal Problem: Multiple falls [R29.6]                 Active Insurance as of 11/18/2022     Primary Coverage     Payor Plan Insurance Group Employer/Plan Group    MEDICARE MEDICARE A & B      Payor Plan Address Payor Plan Phone Number Payor Plan Fax Number Effective Dates    [address] [phone]  11/1/2013 - None Entered    [facility] [id number]       Subscriber Name Subscriber Birth Date Member ID       DIAZ BRUNO 1950 [id number]           Secondary Coverage     Payor Plan Insurance Group Employer/Plan Group    [id number]     Payor Plan Address Payor Plan Phone Number Payor Plan Fax Number Effective Dates    [address]   11/1/2016 - None Entered    [facility] [id number]       Subscriber Name Subscriber Birth Date Member ID       DIAZ BRUNO 1950 [id number]                 Emergency Contacts      (Rel.) Home Phone Work Phone Mobile Phone    Kaitlin Bruno (Spouse) [phone] -- --              "

## 2022-11-18 NOTE — THERAPY EVALUATION
Patient Name: Isaiah Bruno  : 1950    MRN: [id number]                              Today's Date: 2022       Admit Date: 2022    Visit Dx:     ICD-10-CM ICD-9-CM   1. Multiple falls  R29.6 V15.88   2. Closed fracture of right ankle, initial encounter  S82.891A 824.8   3. Arthritis  M19.90 716.90   4. Inability to ambulate due to ankle or foot  R26.2 719.7     Patient Active Problem List   Diagnosis   • Type 2 diabetes mellitus with diabetic polyneuropathy, with long-term current use of insulin ([facility])   • Essential hypertension   • Osteoarthritis of knee   • Chronic anxiety   • Acute arthritis   • BPH (benign prostatic hyperplasia)   • TOMMY (generalized anxiety disorder)   • GERD (gastroesophageal reflux disease)   • Osteoarthritis, generalized   • Painful diabetic neuropathy (HCC)   • Multiple falls   • Arthritis of both feet   • Metatarsalgia   • Hypoglycemia     Past Medical History:   Diagnosis Date   • Anxiety    • Diabetes mellitus ([facility])    • Hypertension    • Osteoarthritis    • Premature ventricular contractions      Past Surgical History:   Procedure Laterality Date   • CATARACT EXTRACTION     • EYE CAPSULOTOMY WITH LASER     • FINGER TENDON REPAIR      right ring finger   • HIATAL HERNIA REPAIR     • KNEE ARTHROSCOPY        General Information     Row Name 22 1154          Physical Therapy Time and Intention    Document Type evaluation  -     Mode of Treatment individual therapy;physical therapy  -     Row Name 22 1154          General Information    Patient Profile Reviewed yes  -SM     Prior Level of Function independent:  -     Existing Precautions/Restrictions fall  -     Row Name 22 1154          Living Environment    People in Home spouse  -     Row Name 22 1154          Home Main Entrance    Number of Stairs, Main Entrance two  -     Row Name 22 1154          Cognition    Orientation Status (Cognition) oriented x 3  -     Row Name  11/18/22 1154          Safety Issues, Functional Mobility    Safety Issues Affecting Function (Mobility) impulsivity;insight into deficits/self-awareness  -     Impairments Affecting Function (Mobility) balance;strength;endurance/activity tolerance;pain  -SM           User Key  (r) = Recorded By, (t) = Taken By, (c) = Cosigned By    Initials Name Provider Type     Fang Lopez PT Physical Therapist               Mobility     Row Name 11/18/22 1154          Bed Mobility    Bed Mobility supine-sit;sit-supine  -SM     Supine-Sit [place] (Bed Mobility) standby assist  -SM     Sit-Supine [place] (Bed Mobility) standby assist  -     Row Name 11/18/22 1154          Bed-Chair Transfer    Bed-Chair [place] (Transfers) not tested  -     Row Name 11/18/22 1154          Sit-Stand Transfer    Sit-Stand [place] (Transfers) contact guard  -     Row Name 11/18/22 1154          Gait/Stairs (Locomotion)    [place] Level (Gait) minimum assist (75% patient effort)  -     Assistive Device (Gait) other (see comments)  no AD  -SM     Distance in Feet (Gait) 25ft  -     Deviations/Abnormal Patterns (Gait) antalgic;zena decreased;gait speed decreased  -SM     Right Sided Gait Deviations knee buckling, right side  -SM     [place] Level (Stairs) not tested  -     Comment, (Gait/Stairs) Patient gait antaligic with R knee buckling and patient requiring standing rest breaks leaning against wall.  -           User Key  (r) = Recorded By, (t) = Taken By, (c) = Cosigned By    Initials Name Provider Type     Fang Lopez PT Physical Therapist               Obj/Interventions     Row Name 11/18/22 1156          Range of Motion Comprehensive    General Range of Motion no range of motion deficits identified  -     Row Name 11/18/22 1156          Strength Comprehensive (MMT)    General Manual Muscle Testing (MMT) Assessment lower extremity strength deficits identified  -     Comment,  General Manual Muscle Testing (MMT) Assessment Generalized LE weakness. R LE grossly 3+/5. L LE 4/5  -SM     Row Name 11/18/22 1156          Balance    Balance Assessment sitting static balance;sitting dynamic balance;sit to stand dynamic balance;standing static balance;standing dynamic balance  -SM     Static Sitting Balance modified independence  -     Dynamic Sitting Balance supervision  -SM     Position, Sitting Balance sitting edge of bed  -SM     Sit to Stand Dynamic Balance contact guard  -SM     Static Standing Balance contact guard  -SM     Dynamic Standing Balance minimal assist  -SM     Balance Interventions sitting;standing;sit to stand;static;dynamic  -SM           User Key  (r) = Recorded By, (t) = Taken By, (c) = Cosigned By    Initials Name Provider Type     Fang Lopez PT Physical Therapist               Goals/Plan     Row Name 11/18/22 1201          Bed Mobility Goal 1 (PT)    Activity/Assistive Device (Bed Mobility Goal 1, PT) bed mobility activities, all  -SM     [place] Level/Cues Needed (Bed Mobility Goal 1, PT) independent  -SM     Time Frame (Bed Mobility Goal 1, PT) 1 week  -     Row Name 11/18/22 1201          Transfer Goal 1 (PT)    Activity/Assistive Device (Transfer Goal 1, PT) sit-to-stand/stand-to-sit;bed-to-chair/chair-to-bed  -SM     [place] Level/Cues Needed (Transfer Goal 1, PT) modified independence  -SM     Time Frame (Transfer Goal 1, PT) 1 week  -     Row Name 11/18/22 1201          Gait Training Goal 1 (PT)    Activity/Assistive Device (Gait Training Goal 1, PT) gait (walking locomotion)  -SM     [place] Level (Gait Training Goal 1, PT) standby assist  -SM     Distance (Gait Training Goal 1, PT) 100ft  -SM     Time Frame (Gait Training Goal 1, PT) 1 week  -           User Key  (r) = Recorded By, (t) = Taken By, (c) = Cosigned By    Initials Name Provider Type     Fang Lopez PT Physical Therapist               Clinical Impression      Row Name 11/18/22 1156          Pain    Pain Location - Side/Orientation Right  -     Pain Location - extremity  -     Pre/Posttreatment Pain Comment Patient did not verbalize number  -     Pain Intervention(s) Rest;Repositioned  -     Row Name 11/18/22 1156          Plan of Care Review    Plan of Care Reviewed With patient;spouse  -     Outcome Evaluation Patient is a 72 y.o male who presents to [facility] with reports of a headache following a fall 2 days ago. Patient also has closed fracture of R ankle. Per ortho patient WBATon R in splint. Patient and spouse report multiple falls recently. Patient lives at home with his wife with 2 steps to enter and a full flight of steps to bedroom. Patient is typically independent at baseline and occasionally uses a cane. Today patient completed all bed mobility with SBA. Patient performed STS from EOB with CGA. Patient ambulated 25ft with Olegario. Patient gait antaligic with R knee buckling and patient requiring standing rest breaks leaning against wall. Patient appears impulsive at times and demonstrates decreased safety awareness. Patient may continue to benefit from skilled PT intervention to address deficits in functional mobility and maximize safety and independence. PT recommends  SNF at d/c. Will continue to monitor.  -     Row Name 11/18/22 1156          Therapy Assessment/Plan (PT)    Rehab Potential (PT) good, to achieve stated therapy goals  -     Criteria for Skilled Interventions Met (PT) yes;skilled treatment is necessary  -     Therapy Frequency (PT) 6 times/wk  -     Row Name 11/18/22 1156          Vital Signs    O2 Delivery Pre Treatment room air  -SM     O2 Delivery Intra Treatment room air  -SM     O2 Delivery Post Treatment room air  -SM     Pre Patient Position Supine  -     Intra Patient Position Standing  -     Post Patient Position Supine  -     Row Name 11/18/22 1156          Positioning and Restraints    Pre-Treatment Position in bed   -SM     Post Treatment Position bed  -SM     In Bed call light within reach;encouraged to call for assist;exit alarm on;notified nsg;with family/caregiver  -           User Key  (r) = Recorded By, (t) = Taken By, (c) = Cosigned By    Initials Name Provider Type    Fang Timmons PT Physical Therapist               Outcome Measures     Row Name 11/18/22 1201          How much help from another person do you currently need...    Turning from your back to your side while in flat bed without using bedrails? 4  -SM     Moving from lying on back to sitting on the side of a flat bed without bedrails? 4  -SM     Moving to and from a bed to a chair (including a wheelchair)? 3  -SM     Standing up from a chair using your arms (e.g., wheelchair, bedside chair)? 3  -SM     Climbing 3-5 steps with a railing? 2  -SM     To walk in hospital room? 3  -SM     AM-PAC 6 Clicks Score (PT) 19  -SM     Highest level of mobility 6 --> Walked 10 steps or more  -     Row Name 11/18/22 1201          Functional Assessment    Outcome Measure Options AM-PAC 6 Clicks Basic Mobility (PT)  -           User Key  (r) = Recorded By, (t) = Taken By, (c) = Cosigned By    Initials Name Provider Type    Fang Timmons PT Physical Therapist                             Physical Therapy Education     Title: PT OT SLP Therapies (In Progress)     Topic: Physical Therapy (In Progress)     Point: Mobility training (Done)     Learning Progress Summary           Patient Acceptance, E, VU by  at 11/18/2022 1202                   Point: Home exercise program (Not Started)     Learner Progress:  Not documented in this visit.          Point: Body mechanics (Done)     Learning Progress Summary           Patient Acceptance, E, VU by  at 11/18/2022 1202                   Point: Precautions (Done)     Learning Progress Summary           Patient Acceptance, E, VU by  at 11/18/2022 1202                               User Key     Initials Effective  Dates Name Provider Type Discipline     05/02/22 -  Fang Lopez PT Physical Therapist PT              PT Recommendation and Plan     Plan of Care Reviewed With: patient, spouse  Outcome Evaluation: Patient is a 72 y.o male who presents to [facility] with reports of a headache following a fall 2 days ago. Patient also has closed fracture of R ankle. Per ortho patient WBATon R in splint. Patient and spouse report multiple falls recently. Patient lives at home with his wife with 2 steps to enter and a full flight of steps to bedroom. Patient is typically independent at baseline and occasionally uses a cane. Today patient completed all bed mobility with SBA. Patient performed STS from EOB with CGA. Patient ambulated 25ft with Olegario. Patient gait antaligic with R knee buckling and patient requiring standing rest breaks leaning against wall. Patient appears impulsive at times and demonstrates decreased safety awareness. Patient may continue to benefit from skilled PT intervention to address deficits in functional mobility and maximize safety and independence. PT recommends  SNF at d/c. Will continue to monitor.     Time Calculation:    PT Charges     Row Name 11/18/22 1203             Time Calculation    Start Time 1138  -SM      Stop Time 1150  -SM      Time Calculation (min) 12 min  -SM      PT Received On 11/18/22  -      PT - Next Appointment 11/19/22  -      PT Goal Re-Cert Due Date 11/25/22  -         Time Calculation- PT    Total Timed Code Minutes- PT 8 minute(s)  -SM         Timed Charges    35577 - PT Therapeutic Activity Minutes 8  -SM         Total Minutes    Timed Charges Total Minutes 8  -SM       Total Minutes 8  -SM            User Key  (r) = Recorded By, (t) = Taken By, (c) = Cosigned By    Initials Name Provider Type     Fang Lopez PT Physical Therapist              Therapy Charges for Today     Code Description Service Date Service Provider Modifiers Qty    68592307947  PT THERAPEUTIC  ACT EA 15 MIN 11/18/2022 Fang Lopez, PT GP 1    01769848149 HC PT EVAL LOW COMPLEXITY 3 11/18/2022 Fang Lopez, PT GP 1          PT G-Codes  Outcome Measure Options: AM-PAC 6 Clicks Basic Mobility (PT)  AM-PAC 6 Clicks Score (PT): 19  PT Discharge Summary  Anticipated Discharge Disposition (PT): skilled nursing facility  Patient was not wearing a face mask during this therapy encounter. Therapist used appropriate personal protective equipment including mask and gloves.  Mask used was standard procedure mask. Appropriate PPE was worn during the entire therapy session. Hand hygiene was completed before and after therapy session. Patient is not in enhanced droplet precautions.     Fang Lopez PT  11/18/2022

## 2022-11-25 NOTE — ED PROVIDER NOTES
EMERGENCY DEPARTMENT ENCOUNTER    Room Number:  123/1  Date of encounter:  11/25/2022  PCP: Patti Rees MD  Historian: Patient      HPI:  Chief Complaint: Fall  A complete HPI/ROS/PMH/PSH/SH/FH are unobtainable due to: none    Context: Isaiah Bruno is a 72 y.o. male who presents to the ED c/o multiple falls at home.  Patient reports extreme pain in his feet from arthritis.  Also reports difficulty walking secondary to pain in feet and ankles bilaterally.      PAST MEDICAL HISTORY  Active Ambulatory Problems     Diagnosis Date Noted   • Type 2 diabetes mellitus with diabetic polyneuropathy, with long-term current use of insulin (HCC) 04/01/2016   • Essential hypertension 04/01/2016   • Osteoarthritis of knee 04/01/2016   • Chronic anxiety 04/01/2016   • Acute arthritis 04/22/2016   • BPH (benign prostatic hyperplasia) 01/30/2018   • TOMMY (generalized anxiety disorder) 01/30/2018   • GERD (gastroesophageal reflux disease) 01/30/2018   • Osteoarthritis, generalized 01/12/2016   • Painful diabetic neuropathy (HCC) 09/30/2019   • Arthritis of both feet 10/25/2022   • Metatarsalgia 11/18/2022     Resolved Ambulatory Problems     Diagnosis Date Noted   • Tendinitis of forearm 04/01/2016   • Encounter for hepatitis C screening test for low risk patient 08/12/2020     Past Medical History:   Diagnosis Date   • Anxiety    • Diabetes mellitus (HCC)    • Hypertension    • Osteoarthritis    • Premature ventricular contractions          PAST SURGICAL HISTORY  Past Surgical History:   Procedure Laterality Date   • CATARACT EXTRACTION     • EYE CAPSULOTOMY WITH LASER     • FINGER TENDON REPAIR      right ring finger   • HIATAL HERNIA REPAIR  1978   • KNEE ARTHROSCOPY           FAMILY HISTORY  Family History   Problem Relation Age of Onset   • Stroke Mother    • Diabetes Mother    • Anxiety disorder Mother    • No Known Problems Father          SOCIAL HISTORY  Social History     Socioeconomic History   • Marital status:     Tobacco Use   • Smoking status: Every Day   • Smokeless tobacco: Never   Substance and Sexual Activity   • Alcohol use: No   • Drug use: Never         ALLERGIES  Patient has no known allergies.        REVIEW OF SYSTEMS  Review of Systems     All systems reviewed and negative except for those discussed in HPI.       PHYSICAL EXAM    I have reviewed the triage vital signs and nursing notes.    ED Triage Vitals [11/17/22 2248]   Temp Heart Rate Resp BP SpO2   98 °F (36.7 °C) 73 18 137/68 95 %      Temp src Heart Rate Source Patient Position BP Location FiO2 (%)   Tympanic Monitor Sitting Left arm --       Physical Exam  GENERAL: not distressed  HENT: nares patent  EYES: no scleral icterus  CV: regular rhythm, regular rate  RESPIRATORY: normal effort  ABDOMEN: soft  MUSCULOSKELETAL: no deformity, tender to palpation over dorsum of bilateral feet also tender over lateral right ankle areas.  Minimal swelling feet are well-perfused bilaterally not appreciate any erythema or swelling to the feet no signs of bruising no deformity  NEURO: alert, moves all extremities, follows commands  SKIN: warm, dry        LAB RESULTS  No results found for this or any previous visit (from the past 24 hour(s)).    Ordered the above labs and independently reviewed the results.        RADIOLOGY  No Radiology Exams Resulted Within Past 24 Hours    I ordered the above noted radiological studies. Reviewed by me and discussed with radiologist.  See dictation for official radiology interpretation.      PROCEDURES    Procedures      MEDICATIONS GIVEN IN ER    Medications   morphine injection 2 mg (2 mg Intravenous Given 11/18/22 0110)   ondansetron (ZOFRAN) injection 4 mg (4 mg Intravenous Given 11/18/22 0110)   dextrose (D50W) (25 g/50 mL) IV injection 25 g (25 g Intravenous Given 11/18/22 0208)   morphine injection 4 mg (4 mg Intravenous Given 11/18/22 0251)   dextrose (D50W) (25 g/50 mL) IV injection 25 g (25 g Intravenous Given  11/18/22 0414)         PROGRESS, DATA ANALYSIS, CONSULTS, AND MEDICAL DECISION MAKING    All labs have been independently reviewed by me.  All radiology studies have been reviewed by me and discussed with radiologist dictating the report.   EKG's independently viewed and interpreted by me.  Discussion below represents my analysis of pertinent findings related to patient's condition, differential diagnosis, treatment plan and final disposition.        ED Course as of 11/25/22 9233 Fri Nov 18, 2022   0235 Patient reports unable to leave his bed even before the fall secondary to arthritis pain.  Patient with frequent falls.  Family is unable to care for patient at home.  Will admit for pain control and PT consult. [TJ]      ED Course User Index  [TJ] Raffi Gutierrez MD           PPE: The patient wore a surgical mask throughout the entire patient encounter. I wore an N95.    AS OF 07:33 EST VITALS:    BP - 142/88  HR - 71  TEMP - 98.2 °F (36.8 °C) (Oral)  O2 SATS - 94%        DIAGNOSIS  Final diagnoses:   Multiple falls   Closed fracture of right ankle, initial encounter   Arthritis   Inability to ambulate due to ankle or foot         DISPOSITION  admit           Raffi Gutierrez MD  11/25/22 0048

## 2023-02-09 ENCOUNTER — TELEPHONE (OUTPATIENT)
Dept: FAMILY MEDICINE CLINIC | Facility: CLINIC | Age: 73
End: 2023-02-09

## 2023-02-09 NOTE — TELEPHONE ENCOUNTER
Caller: Isaiah Bruno     Relationship: [unfilled]     Best call back number: [phone]    What is your medical concern? PAIN IN FEET STATES THIS IS VERY BAD AND THAT HE HAS TRIED SEEING SEVERAL DIFFERENT PROVIDERS FOR THIS BUT DOES NOT FEEL LIKE ANYONE HAS HELPED HIM AS WELL AS DR WARD HAS.     How long has this issue been going on? LONG TERM ISSUE     Is your provider already aware of this issue? YES    Have you been treated for this issue? YES

## 2023-02-10 DIAGNOSIS — G89.29 CHRONIC PAIN OF BOTH FEET: Primary | ICD-10-CM

## 2023-02-10 DIAGNOSIS — M79.672 CHRONIC PAIN OF BOTH FEET: Primary | ICD-10-CM

## 2023-02-10 DIAGNOSIS — M79.671 CHRONIC PAIN OF BOTH FEET: Primary | ICD-10-CM

## 2023-03-06 ENCOUNTER — APPOINTMENT (OUTPATIENT)
Dept: GENERAL RADIOLOGY | Facility: HOSPITAL | Age: 73
DRG: 690 | End: 2023-03-06
Payer: MEDICARE

## 2023-03-06 ENCOUNTER — HOSPITAL ENCOUNTER (INPATIENT)
Facility: HOSPITAL | Age: 73
LOS: 2 days | Discharge: HOME | DRG: 690 | End: 2023-03-08
Attending: EMERGENCY MEDICINE | Admitting: INTERNAL MEDICINE
Payer: MEDICARE

## 2023-03-06 DIAGNOSIS — E11.42 TYPE 2 DIABETES MELLITUS WITH DIABETIC POLYNEUROPATHY, WITH LONG-TERM CURRENT USE OF INSULIN: ICD-10-CM

## 2023-03-06 DIAGNOSIS — T38.3X5A HYPOGLYCEMIA DUE TO INSULIN: ICD-10-CM

## 2023-03-06 DIAGNOSIS — R31.9 URINARY TRACT INFECTION WITH HEMATURIA, SITE UNSPECIFIED: Primary | ICD-10-CM

## 2023-03-06 DIAGNOSIS — R07.9 CHEST PAIN, UNSPECIFIED TYPE: ICD-10-CM

## 2023-03-06 DIAGNOSIS — E16.0 HYPOGLYCEMIA DUE TO INSULIN: ICD-10-CM

## 2023-03-06 DIAGNOSIS — N39.0 URINARY TRACT INFECTION WITH HEMATURIA, SITE UNSPECIFIED: Primary | ICD-10-CM

## 2023-03-06 DIAGNOSIS — Z79.4 TYPE 2 DIABETES MELLITUS WITH DIABETIC POLYNEUROPATHY, WITH LONG-TERM CURRENT USE OF INSULIN: ICD-10-CM

## 2023-03-06 DIAGNOSIS — F41.9 CHRONIC ANXIETY: ICD-10-CM

## 2023-03-06 LAB
ALBUMIN SERPL-MCNC: 4.3 G/DL (ref 3.5–5.2)
ALBUMIN/GLOB SERPL: 1.5 G/DL
ALP SERPL-CCNC: 100 U/L (ref 39–117)
ALT SERPL W P-5'-P-CCNC: 12 U/L (ref 1–41)
ANION GAP SERPL CALCULATED.3IONS-SCNC: 9 MMOL/L (ref 5–15)
AST SERPL-CCNC: 14 U/L (ref 1–40)
BACTERIA UR QL AUTO: ABNORMAL /HPF
BASOPHILS # BLD AUTO: 0.06 10*3/MM3 (ref 0–0.2)
BASOPHILS NFR BLD AUTO: 0.4 % (ref 0–1.5)
BILIRUB SERPL-MCNC: 0.5 MG/DL (ref 0–1.2)
BILIRUB UR QL STRIP: NEGATIVE
BUN SERPL-MCNC: 18 MG/DL (ref 8–23)
BUN/CREAT SERPL: 14.4 (ref 7–25)
CALCIUM SPEC-SCNC: 9.7 MG/DL (ref 8.6–10.5)
CHLORIDE SERPL-SCNC: 105 MMOL/L (ref 98–107)
CLARITY UR: ABNORMAL
CO2 SERPL-SCNC: 24 MMOL/L (ref 22–29)
COLOR UR: ABNORMAL
CREAT SERPL-MCNC: 1.25 MG/DL (ref 0.76–1.27)
DEPRECATED RDW RBC AUTO: 40.5 FL (ref 37–54)
EGFRCR SERPLBLD CKD-EPI 2021: 60.4 ML/MIN/1.73
EOSINOPHIL # BLD AUTO: 0.02 10*3/MM3 (ref 0–0.4)
EOSINOPHIL NFR BLD AUTO: 0.1 % (ref 0.3–6.2)
ERYTHROCYTE [DISTWIDTH] IN BLOOD BY AUTOMATED COUNT: 12.5 % (ref 12.3–15.4)
GEN 5 2HR TROPONIN T REFLEX: 54 NG/L
GLOBULIN UR ELPH-MCNC: 2.8 GM/DL
GLUCOSE BLDC GLUCOMTR-MCNC: 111 MG/DL (ref 70–130)
GLUCOSE BLDC GLUCOMTR-MCNC: 38 MG/DL (ref 70–130)
GLUCOSE BLDC GLUCOMTR-MCNC: 54 MG/DL (ref 70–130)
GLUCOSE SERPL-MCNC: 43 MG/DL (ref 65–99)
GLUCOSE UR STRIP-MCNC: NEGATIVE MG/DL
HBA1C MFR BLD: 8 % (ref 4.8–5.6)
HCT VFR BLD AUTO: 51.9 % (ref 37.5–51)
HGB BLD-MCNC: 18 G/DL (ref 13–17.7)
HGB UR QL STRIP.AUTO: ABNORMAL
HOLD SPECIMEN: NORMAL
HOLD SPECIMEN: NORMAL
HYALINE CASTS UR QL AUTO: ABNORMAL /LPF
IMM GRANULOCYTES # BLD AUTO: 0.06 10*3/MM3 (ref 0–0.05)
IMM GRANULOCYTES NFR BLD AUTO: 0.4 % (ref 0–0.5)
KETONES UR QL STRIP: ABNORMAL
LEUKOCYTE ESTERASE UR QL STRIP.AUTO: ABNORMAL
LYMPHOCYTES # BLD AUTO: 1.58 10*3/MM3 (ref 0.7–3.1)
LYMPHOCYTES NFR BLD AUTO: 11 % (ref 19.6–45.3)
MCH RBC QN AUTO: 30.3 PG (ref 26.6–33)
MCHC RBC AUTO-ENTMCNC: 34.7 G/DL (ref 31.5–35.7)
MCV RBC AUTO: 87.4 FL (ref 79–97)
MONOCYTES # BLD AUTO: 0.91 10*3/MM3 (ref 0.1–0.9)
MONOCYTES NFR BLD AUTO: 6.3 % (ref 5–12)
NEUTROPHILS NFR BLD AUTO: 11.76 10*3/MM3 (ref 1.7–7)
NEUTROPHILS NFR BLD AUTO: 81.8 % (ref 42.7–76)
NITRITE UR QL STRIP: POSITIVE
NRBC BLD AUTO-RTO: 0 /100 WBC (ref 0–0.2)
PH UR STRIP.AUTO: 6.5 [PH] (ref 5–8)
PLATELET # BLD AUTO: 304 10*3/MM3 (ref 140–450)
PMV BLD AUTO: 11.3 FL (ref 6–12)
POTASSIUM SERPL-SCNC: 4.1 MMOL/L (ref 3.5–5.2)
PROCALCITONIN SERPL-MCNC: 0.09 NG/ML (ref 0–0.25)
PROT SERPL-MCNC: 7.1 G/DL (ref 6–8.5)
PROT UR QL STRIP: ABNORMAL
RBC # BLD AUTO: 5.94 10*6/MM3 (ref 4.14–5.8)
RBC # UR STRIP: ABNORMAL /HPF
REF LAB TEST METHOD: ABNORMAL
SODIUM SERPL-SCNC: 138 MMOL/L (ref 136–145)
SP GR UR STRIP: 1.02 (ref 1–1.03)
SQUAMOUS #/AREA URNS HPF: ABNORMAL /HPF
TROPONIN T DELTA: -4 NG/L
TROPONIN T SERPL HS-MCNC: 58 NG/L
TROPONIN T SERPL HS-MCNC: 66 NG/L
UROBILINOGEN UR QL STRIP: ABNORMAL
WBC # UR STRIP: ABNORMAL /HPF
WBC NRBC COR # BLD: 14.39 10*3/MM3 (ref 3.4–10.8)
WHOLE BLOOD HOLD COAG: NORMAL
WHOLE BLOOD HOLD SPECIMEN: NORMAL

## 2023-03-06 PROCEDURE — 25010000002 CEFTRIAXONE PER 250 MG: Performed by: EMERGENCY MEDICINE

## 2023-03-06 PROCEDURE — 80053 COMPREHEN METABOLIC PANEL: CPT | Performed by: EMERGENCY MEDICINE

## 2023-03-06 PROCEDURE — 93005 ELECTROCARDIOGRAM TRACING: CPT

## 2023-03-06 PROCEDURE — 84484 ASSAY OF TROPONIN QUANT: CPT | Performed by: EMERGENCY MEDICINE

## 2023-03-06 PROCEDURE — 36415 COLL VENOUS BLD VENIPUNCTURE: CPT

## 2023-03-06 PROCEDURE — 87086 URINE CULTURE/COLONY COUNT: CPT | Performed by: EMERGENCY MEDICINE

## 2023-03-06 PROCEDURE — 25010000002 MORPHINE PER 10 MG: Performed by: EMERGENCY MEDICINE

## 2023-03-06 PROCEDURE — 93010 ELECTROCARDIOGRAM REPORT: CPT | Performed by: INTERNAL MEDICINE

## 2023-03-06 PROCEDURE — 99285 EMERGENCY DEPT VISIT HI MDM: CPT

## 2023-03-06 PROCEDURE — 81001 URINALYSIS AUTO W/SCOPE: CPT | Performed by: EMERGENCY MEDICINE

## 2023-03-06 PROCEDURE — 82962 GLUCOSE BLOOD TEST: CPT

## 2023-03-06 PROCEDURE — 85025 COMPLETE CBC W/AUTO DIFF WBC: CPT | Performed by: EMERGENCY MEDICINE

## 2023-03-06 PROCEDURE — 83036 HEMOGLOBIN GLYCOSYLATED A1C: CPT | Performed by: NURSE PRACTITIONER

## 2023-03-06 PROCEDURE — 71045 X-RAY EXAM CHEST 1 VIEW: CPT

## 2023-03-06 PROCEDURE — 84145 PROCALCITONIN (PCT): CPT | Performed by: NURSE PRACTITIONER

## 2023-03-06 RX ORDER — ALUMINA, MAGNESIA, AND SIMETHICONE 2400; 2400; 240 MG/30ML; MG/30ML; MG/30ML
15 SUSPENSION ORAL EVERY 6 HOURS PRN
Status: DISCONTINUED | OUTPATIENT
Start: 2023-03-06 | End: 2023-03-08 | Stop reason: HOSPADM

## 2023-03-06 RX ORDER — UREA 10 %
3 LOTION (ML) TOPICAL NIGHTLY PRN
Status: DISCONTINUED | OUTPATIENT
Start: 2023-03-06 | End: 2023-03-08 | Stop reason: HOSPADM

## 2023-03-06 RX ORDER — MORPHINE SULFATE 2 MG/ML
4 INJECTION, SOLUTION INTRAMUSCULAR; INTRAVENOUS ONCE
Status: COMPLETED | OUTPATIENT
Start: 2023-03-06 | End: 2023-03-06

## 2023-03-06 RX ORDER — IBUPROFEN 600 MG/1
1 TABLET ORAL
Status: DISCONTINUED | OUTPATIENT
Start: 2023-03-06 | End: 2023-03-08 | Stop reason: HOSPADM

## 2023-03-06 RX ORDER — SODIUM CHLORIDE 0.9 % (FLUSH) 0.9 %
10 SYRINGE (ML) INJECTION AS NEEDED
Status: DISCONTINUED | OUTPATIENT
Start: 2023-03-06 | End: 2023-03-08 | Stop reason: HOSPADM

## 2023-03-06 RX ORDER — DEXTROSE MONOHYDRATE 25 G/50ML
25 INJECTION, SOLUTION INTRAVENOUS
Status: DISCONTINUED | OUTPATIENT
Start: 2023-03-06 | End: 2023-03-08 | Stop reason: HOSPADM

## 2023-03-06 RX ORDER — INSULIN LISPRO 100 [IU]/ML
0-9 INJECTION, SOLUTION INTRAVENOUS; SUBCUTANEOUS
Status: DISCONTINUED | OUTPATIENT
Start: 2023-03-07 | End: 2023-03-08 | Stop reason: HOSPADM

## 2023-03-06 RX ORDER — ONDANSETRON 4 MG/1
4 TABLET, FILM COATED ORAL EVERY 6 HOURS PRN
Status: DISCONTINUED | OUTPATIENT
Start: 2023-03-06 | End: 2023-03-08 | Stop reason: HOSPADM

## 2023-03-06 RX ORDER — NITROGLYCERIN 0.4 MG/1
0.4 TABLET SUBLINGUAL
Status: DISCONTINUED | OUTPATIENT
Start: 2023-03-06 | End: 2023-03-08 | Stop reason: HOSPADM

## 2023-03-06 RX ORDER — ONDANSETRON 2 MG/ML
4 INJECTION INTRAMUSCULAR; INTRAVENOUS EVERY 6 HOURS PRN
Status: DISCONTINUED | OUTPATIENT
Start: 2023-03-06 | End: 2023-03-08 | Stop reason: HOSPADM

## 2023-03-06 RX ORDER — NICOTINE POLACRILEX 4 MG
15 LOZENGE BUCCAL
Status: DISCONTINUED | OUTPATIENT
Start: 2023-03-06 | End: 2023-03-08 | Stop reason: HOSPADM

## 2023-03-06 RX ORDER — ACETAMINOPHEN 325 MG/1
650 TABLET ORAL EVERY 4 HOURS PRN
Status: DISCONTINUED | OUTPATIENT
Start: 2023-03-06 | End: 2023-03-08 | Stop reason: HOSPADM

## 2023-03-06 RX ORDER — DEXTROSE MONOHYDRATE 25 G/50ML
25 INJECTION, SOLUTION INTRAVENOUS ONCE
Status: DISCONTINUED | OUTPATIENT
Start: 2023-03-06 | End: 2023-03-06

## 2023-03-06 RX ORDER — DIAZEPAM 5 MG/1
5 TABLET ORAL EVERY 8 HOURS PRN
Status: DISCONTINUED | OUTPATIENT
Start: 2023-03-06 | End: 2023-03-07

## 2023-03-06 RX ADMIN — CEFTRIAXONE SODIUM 1 G: 1 INJECTION, POWDER, FOR SOLUTION INTRAMUSCULAR; INTRAVENOUS at 18:59

## 2023-03-06 RX ADMIN — MORPHINE SULFATE 4 MG: 2 INJECTION, SOLUTION INTRAMUSCULAR; INTRAVENOUS at 18:31

## 2023-03-06 NOTE — ED PROVIDER NOTES
EMERGENCY DEPARTMENT ENCOUNTER    Room Number:  33/33  Date seen:  3/6/2023  PCP: Patti Rees MD  Historian: Patient, wife at bedside who helps with history      HPI:  Chief Complaint: Urinary retention  A complete HPI/ROS/PMH/PSH/SH/FH are unobtainable due to:   Context: Isaiah Bruno is a 72 y.o. male who presents to the ED c/o urine retention.  Patient states he was last able to urinate fully last night.  He did have moderate amount of blood in the urine last night.  Today he has not been able to urinate well and feels like his bladder is full.  On the way to the ER about 2:00 he developed some vague discomfort across the chest as well as some shortness of breath.  He continues to not urinate well.  Wife states he did have a history of urinary retention in the past but patient cannot recall this.  Patient states he does not see a urologist on a regular basis.  Denies history of heart disease and does not see a cardiologist.  Primary care provider is Dr. Patti Pompa.      MEDICAL RECORD REVIEW (non ED)  I reviewed prior medical records including most recent hospitalization in November of last year.  Patient was hospitalized with multiple falls and right lower extremity fracture.  He was treated nonoperatively with immobilization of the right lower extremity.  Chronic medical problems include diabetic neuropathy, anxiety disorder, diabetes type 2, hypertension and osteoarthritis.    PAST MEDICAL HISTORY  Active Ambulatory Problems     Diagnosis Date Noted   • Type 2 diabetes mellitus with diabetic polyneuropathy, with long-term current use of insulin ([facility]) 04/01/2016   • Essential hypertension 04/01/2016   • Osteoarthritis of knee 04/01/2016   • Chronic anxiety 04/01/2016   • Acute arthritis 04/22/2016   • BPH (benign prostatic hyperplasia) 01/30/2018   • TOMMY (generalized anxiety disorder) 01/30/2018   • GERD (gastroesophageal reflux disease) 01/30/2018   • Osteoarthritis, generalized 01/12/2016   •  Painful diabetic neuropathy (HCC) 09/30/2019   • Multiple falls 11/18/2022   • Arthritis of both feet 10/25/2022   • Metatarsalgia 11/18/2022     Resolved Ambulatory Problems     Diagnosis Date Noted   • Tendinitis of forearm 04/01/2016   • Encounter for hepatitis C screening test for low risk patient 08/12/2020   • Hypoglycemia 11/18/2022     Past Medical History:   Diagnosis Date   • Anxiety    • Diabetes mellitus (HCC)    • Hypertension    • Osteoarthritis    • Premature ventricular contractions          PAST SURGICAL HISTORY  Past Surgical History:   Procedure Laterality Date   • CATARACT EXTRACTION     • EYE CAPSULOTOMY WITH LASER     • FINGER TENDON REPAIR      right ring finger   • HIATAL HERNIA REPAIR  1978   • KNEE ARTHROSCOPY           FAMILY HISTORY  Family History   Problem Relation Age of Onset   • Stroke Mother    • Diabetes Mother    • Anxiety disorder Mother    • No Known Problems Father          SOCIAL HISTORY  Social History     Socioeconomic History   • Marital status:    Tobacco Use   • Smoking status: Every Day     Packs/day: 1.00     Years: 50.00     Pack years: 50.00     Types: Cigarettes   • Smokeless tobacco: Never   Substance and Sexual Activity   • Alcohol use: No   • Drug use: Never         ALLERGIES  Patient has no known allergies.        REVIEW OF SYSTEMS  Review of Systems   Constitutional: Negative for fever.   Respiratory: Positive for shortness of breath.    Cardiovascular: Positive for chest pain.   Genitourinary: Positive for difficulty urinating.   All other systems reviewed and are negative.           PHYSICAL EXAM  ED Triage Vitals   Temp Heart Rate Resp BP SpO2   03/06/23 1539 03/06/23 1539 03/06/23 1539 03/06/23 1621 03/06/23 1539   98.4 °F (36.9 °C) 110 26 109/64 95 %      Temp src Heart Rate Source Patient Position BP Location FiO2 (%)   -- 03/06/23 1621 03/06/23 1621 03/06/23 1621 --    Monitor Sitting Left arm        Physical Exam    GENERAL: Alert male in no  obvious distress.  Triage vitals are notable for elevated pulse of 110.  Temperature heart rate, blood pressure and O2 sats are benign.  HENT: nares patent  EYES: no scleral icterus  CV: regular rhythm, regular rate-no murmur  RESPIRATORY: normal effort, clear to auscultation bilaterally, O2 sats mid 90s on room air  ABDOMEN: soft, obese-mild suprapubic tenderness-no rebound or guarding  MUSCULOSKELETAL: no deformity-no segment swelling or tenderness to palpation  NEURO: Strength sensation and coordination are grossly intact.  Speech and mentation are unremarkable  SKIN: warm, dry      Vital signs and nursing notes reviewed.          LAB RESULTS  Recent Results (from the past 24 hour(s))   Comprehensive Metabolic Panel    Collection Time: 03/06/23  4:26 PM    Specimen: Blood   Result Value Ref Range    Glucose 43 (C) 65 - 99 mg/dL    BUN 18 8 - 23 mg/dL    Creatinine 1.25 0.76 - 1.27 mg/dL    Sodium 138 136 - 145 mmol/L    Potassium 4.1 3.5 - 5.2 mmol/L    Chloride 105 98 - 107 mmol/L    CO2 24.0 22.0 - 29.0 mmol/L    Calcium 9.7 8.6 - 10.5 mg/dL    Total Protein 7.1 6.0 - 8.5 g/dL    Albumin 4.3 3.5 - 5.2 g/dL    ALT (SGPT) 12 1 - 41 U/L    AST (SGOT) 14 1 - 40 U/L    Alkaline Phosphatase 100 39 - 117 U/L    Total Bilirubin 0.5 0.0 - 1.2 mg/dL    Globulin 2.8 gm/dL    A/G Ratio 1.5 g/dL    BUN/Creatinine Ratio 14.4 7.0 - 25.0    Anion Gap 9.0 5.0 - 15.0 mmol/L    eGFR 60.4 >60.0 mL/min/1.73   Single High Sensitivity Troponin T    Collection Time: 03/06/23  4:26 PM    Specimen: Blood   Result Value Ref Range    HS Troponin T 66 (C) <15 ng/L   CBC Auto Differential    Collection Time: 03/06/23  4:26 PM    Specimen: Blood   Result Value Ref Range    WBC 14.39 (H) 3.40 - 10.80 10*3/mm3    RBC 5.94 (H) 4.14 - 5.80 10*6/mm3    Hemoglobin 18.0 (H) 13.0 - 17.7 g/dL    Hematocrit 51.9 (H) 37.5 - 51.0 %    MCV 87.4 79.0 - 97.0 fL    MCH 30.3 26.6 - 33.0 pg    MCHC 34.7 31.5 - 35.7 g/dL    RDW 12.5 12.3 - 15.4 %    RDW-SD  40.5 37.0 - 54.0 fl    MPV 11.3 6.0 - 12.0 fL    Platelets 304 140 - 450 10*3/mm3    Neutrophil % 81.8 (H) 42.7 - 76.0 %    Lymphocyte % 11.0 (L) 19.6 - 45.3 %    Monocyte % 6.3 5.0 - 12.0 %    Eosinophil % 0.1 (L) 0.3 - 6.2 %    Basophil % 0.4 0.0 - 1.5 %    Immature Grans % 0.4 0.0 - 0.5 %    Neutrophils, Absolute 11.76 (H) 1.70 - 7.00 10*3/mm3    Lymphocytes, Absolute 1.58 0.70 - 3.10 10*3/mm3    Monocytes, Absolute 0.91 (H) 0.10 - 0.90 10*3/mm3    Eosinophils, Absolute 0.02 0.00 - 0.40 10*3/mm3    Basophils, Absolute 0.06 0.00 - 0.20 10*3/mm3    Immature Grans, Absolute 0.06 (H) 0.00 - 0.05 10*3/mm3    nRBC 0.0 0.0 - 0.2 /100 WBC   Green Top (Gel)    Collection Time: 03/06/23  4:26 PM   Result Value Ref Range    Extra Tube Hold for add-ons.    Lavender Top    Collection Time: 03/06/23  4:26 PM   Result Value Ref Range    Extra Tube hold for add-on    Gold Top - SST    Collection Time: 03/06/23  4:26 PM   Result Value Ref Range    Extra Tube Hold for add-ons.    Light Blue Top    Collection Time: 03/06/23  4:26 PM   Result Value Ref Range    Extra Tube Hold for add-ons.    Urinalysis With Microscopic If Indicated (No Culture) - Urine, Clean Catch    Collection Time: 03/06/23  5:08 PM    Specimen: Urine, Clean Catch   Result Value Ref Range    Color, UA Dark Yellow (A) Yellow, Straw    Appearance, UA Cloudy (A) Clear    pH, UA 6.5 5.0 - 8.0    Specific Gravity, UA 1.025 1.005 - 1.030    Glucose, UA Negative Negative    Ketones, UA 15 mg/dL (1+) (A) Negative    Bilirubin, UA Negative Negative    Blood, UA Large (3+) (A) Negative    Protein, UA >=300 mg/dL (3+) (A) Negative    Leuk Esterase, UA Moderate (2+) (A) Negative    Nitrite, UA Positive (A) Negative    Urobilinogen, UA 2.0 E.U./dL (A) 0.2 - 1.0 E.U./dL   Urinalysis, Microscopic Only - Urine, Clean Catch    Collection Time: 03/06/23  5:08 PM    Specimen: Urine, Clean Catch   Result Value Ref Range    RBC, UA Too Numerous to Count (A) None Seen, 0-2 /HPF     WBC, UA Too Numerous to Count (A) None Seen, 0-2 /HPF    Bacteria, UA 2+ (A) None Seen /HPF    Squamous Epithelial Cells, UA 3-6 (A) None Seen, 0-2 /HPF    Hyaline Casts, UA None Seen None Seen /LPF    Methodology Automated Microscopy    POC Glucose Once    Collection Time: 03/06/23  5:21 PM    Specimen: Blood   Result Value Ref Range    Glucose 38 (C) 70 - 130 mg/dL   High Sensitivity Troponin T    Collection Time: 03/06/23  5:38 PM    Specimen: Blood   Result Value Ref Range    HS Troponin T 58 (C) <15 ng/L   POC Glucose Once    Collection Time: 03/06/23  6:46 PM    Specimen: Blood   Result Value Ref Range    Glucose 54 (L) 70 - 130 mg/dL       Ordered the above labs and independently interpreted results. My findings will be discussed in the medical decision making section below        RADIOLOGY  No Radiology Exams Resulted Within Past 24 Hours            PROCEDURES  Procedures          MEDICATIONS GIVEN IN ER  Medications   dextrose (D50W) (25 g/50 mL) IV injection 25 g (has no administration in time range)   morphine injection 4 mg (4 mg Intravenous Given 3/6/23 1831)   cefTRIAXone (ROCEPHIN) 1 g in sodium chloride 0.9 % 100 mL IVPB-VTB (1 g Intravenous New Bag 3/6/23 1859)               MEDICAL DECISION MAKING, PROGRESS, and CONSULTS    All labs have been independently reviewed by me.  All radiology studies have been reviewed by me and I have also reviewed the radiology report.   EKG's independently viewed and interpreted by me.  Discussion below represents my analysis of pertinent findings related to patient's condition, differential diagnosis, treatment plan and final disposition.      Additional sources:  - Discussed/ obtained information from independent historians: Wife at bedside    - External (non-ED) record review: Please see documented above    - Chronic or social conditions impacting care: Diabetes, hypertension, anxiety    - Shared decision making: I discussed ED evaluation and treatment plan with  patient who is in agreement.  Patient presents to the ER with feeling of urinary retention.  He did have bloody urine last night and has the sensation of full bladder without been able to urinate.  After catheterization there was really no urine output bladder scan was 0.  We will go ahead and remove the urinary catheter.  Urinalysis is suggestive of acute urinary tract infection with a fair amount of whites reds and bacteria.  Patient does have some leukocytosis and has been hypoglycemic and a known diabetic.    He has been awake and alert but gets sweaty on occasion.  I just gave him several glasses of juice and several peanut butter and crackers.      Orders placed during this visit:  Orders Placed This Encounter   Procedures   • Urine Culture - Urine, Urine, Clean Catch   • XR Chest 1 View   • Urinalysis With Microscopic If Indicated (No Culture) - Urine, Clean Catch   • Comprehensive Metabolic Panel   • [place] Draw   • Single High Sensitivity Troponin T   • CBC Auto Differential   • High Sensitivity Troponin T   • Urinalysis, Microscopic Only - Urine, Clean Catch   • High Sensitivity Troponin T 2Hr   • Insert 3-way Urinary Catheter   • Assess Need for Indwelling Urinary Catheter - Follow Removal Protocol   • Urinary Catheter Care   • Cardiac Monitoring   • [facility] (on-call MD unless specified) Details   • POC Glucose Once   • POC Glucose Once   • ECG 12 Lead Chest Pain   • Inpatient Admission   • CBC & Differential   • Green Top (Gel)   • Lavender Top   • Gold Top - SST   • Light Blue Top           Differential diagnosis:    Please see as documented below in ED course      Independent interpretation of labs, radiology studies, and discussions with consultants:  ED Course as of 03/06/23 1936   Mon Mar 06, 2023   1712 EKG independently interpreted by me    Time 4:21 PM  Sinus 88 with occasional PACs  Normal P waves and TN intervals  Left axis deviation, LAFB  ST and T wave-nonspecific changes    Not significant  change when compared to 11/2022   [DB]   1722 Nursing staff is notified me that patient's Accu-Chek was 38.  Patient is a diabetic and has not eaten today.  He is awake and alert so we will go ahead and give him some orange juice or peanut butter to get his sugars up. [DB]   1834 Three-way catheter was inserted but there was very little urine output.  Bladder scan subsequent to catheter insertion showed almost no urine within the bladder. [DB]   1835 Urinalysis from clean-catch urine shows too numerous to count reds and too numerous to count whites with 2+ bacteria.  There are positive nitrites and leukocytes.    CBC reviewed shows elevated white count of 14.4.  There is also elevated hemoglobin which could suggest volume concentration or polycythemia. [DB]   1836 Review of prior CBCs does show some polycythemia in the past with hemoglobins as high as eighteen 1 year ago. [DB]   1836 Chemistries are notable for normal renal function and fairly benign electrolytes.  Patient was fairly hypoglycemic with glucose readings of 43 at 38.  He was treated with some food which did help. [DB]   1836 At this point I think we are dealing with urinary tract infection with hematuria.  We will make sure we get a culture on the urine and then start some IV Rocephin.  Will admit to the hospitalist for further evaluation.    Patient did have some chest pain which was somewhat atypical.  EKG is benign but he has had moderately elevated serum troponins with mild change.  I feel that acute coronary syndrome is unlikely but he has not had prior cardiac evaluation and certainly has numerous cardiac risk factors. [DB]   1837 Patient continues to complain of pain with urinary catheter.  We will go ahead and remove it since he is not draining any significant urine. [DB]   1842 Patient denying chest pain at this time but feels like his sugar has dropped again.  We will go ahead and give him several glasses of orange juice and peanut butter and  crackers. [DB]   1842 I discussed treatment and evaluation of this patient with Dr. Jose Lucas who will admit on behalf of [facility]. [DB]   1935 Patient has had again recurrence of some hypoglycemia with most recent glucose of 54.  He has eaten orange juice and crackers with peanut butter but we will go ahead and give an amp of D50 to try to help increase his glucose level. [DB]      ED Course User Index  [DB] Dariusz Martinez MD             I used full protective equipment while examining this patient.  This includes face mask, gloves and protective eyewear.  I washed my hands before entering the room and immediately upon leaving the room    DIAGNOSIS  Final diagnoses:   Urinary tract infection with hematuria, site unspecified   Hypoglycemia due to insulin   Chest pain, unspecified type   Chronic anxiety   Type 2 diabetes mellitus with diabetic polyneuropathy, with long-term current use of insulin ([facility])         DISPOSITION  Admission            Latest Documented Vital Signs:  As of 19:36 EST  BP- 138/72 HR- 81 Temp- 98.4 °F (36.9 °C) O2 sat- 94%              --    Please note that portions of this were completed with a voice recognition program.       Note Disclaimer: At [facility], we believe that sharing information builds trust and better relationships. You are receiving this note because you are receiving care at [facility] or recently visited. It is possible you will see health information before a provider has talked with you about it. This kind of information can be easy to misunderstand. To help you fully understand what it means for your health, we urge you to discuss this note with your provider.           Dariusz Martinez MD  03/06/23 1936

## 2023-03-06 NOTE — ED TRIAGE NOTES
Pt c/o blood in urine and urinary retention that started last night. Pt c/o chest pain and soa that started when he was on the way to emergency room. Denies fever    This RN wore mask and goggles during time of contact

## 2023-03-06 NOTE — ED NOTES
Since this afternoon, patient has had trouble urinating, patient was able to urinate and was bright red blood. Associated groin pain.

## 2023-03-07 ENCOUNTER — APPOINTMENT (OUTPATIENT)
Dept: CT IMAGING | Facility: HOSPITAL | Age: 73
DRG: 690 | End: 2023-03-07
Payer: MEDICARE

## 2023-03-07 LAB
ANION GAP SERPL CALCULATED.3IONS-SCNC: 11.1 MMOL/L (ref 5–15)
BACTERIA SPEC AEROBE CULT: NO GROWTH
BUN SERPL-MCNC: 19 MG/DL (ref 8–23)
BUN/CREAT SERPL: 18.4 (ref 7–25)
CALCIUM SPEC-SCNC: 9.4 MG/DL (ref 8.6–10.5)
CHLORIDE SERPL-SCNC: 101 MMOL/L (ref 98–107)
CO2 SERPL-SCNC: 23.9 MMOL/L (ref 22–29)
CREAT SERPL-MCNC: 1.03 MG/DL (ref 0.76–1.27)
D-LACTATE SERPL-SCNC: 1.8 MMOL/L (ref 0.5–2)
DEPRECATED RDW RBC AUTO: 40.6 FL (ref 37–54)
EGFRCR SERPLBLD CKD-EPI 2021: 76.2 ML/MIN/1.73
ERYTHROCYTE [DISTWIDTH] IN BLOOD BY AUTOMATED COUNT: 12.5 % (ref 12.3–15.4)
GLUCOSE BLDC GLUCOMTR-MCNC: 245 MG/DL (ref 70–130)
GLUCOSE BLDC GLUCOMTR-MCNC: 265 MG/DL (ref 70–130)
GLUCOSE BLDC GLUCOMTR-MCNC: 274 MG/DL (ref 70–130)
GLUCOSE BLDC GLUCOMTR-MCNC: 294 MG/DL (ref 70–130)
GLUCOSE SERPL-MCNC: 164 MG/DL (ref 65–99)
HCT VFR BLD AUTO: 52.9 % (ref 37.5–51)
HGB BLD-MCNC: 17.6 G/DL (ref 13–17.7)
MCH RBC QN AUTO: 29.4 PG (ref 26.6–33)
MCHC RBC AUTO-ENTMCNC: 33.3 G/DL (ref 31.5–35.7)
MCV RBC AUTO: 88.3 FL (ref 79–97)
PLATELET # BLD AUTO: 269 10*3/MM3 (ref 140–450)
PMV BLD AUTO: 11.4 FL (ref 6–12)
POTASSIUM SERPL-SCNC: 4.1 MMOL/L (ref 3.5–5.2)
RBC # BLD AUTO: 5.99 10*6/MM3 (ref 4.14–5.8)
SODIUM SERPL-SCNC: 136 MMOL/L (ref 136–145)
TROPONIN T SERPL HS-MCNC: 45 NG/L
WBC NRBC COR # BLD: 12.01 10*3/MM3 (ref 3.4–10.8)

## 2023-03-07 PROCEDURE — 87040 BLOOD CULTURE FOR BACTERIA: CPT | Performed by: NURSE PRACTITIONER

## 2023-03-07 PROCEDURE — 25510000001 IOPAMIDOL 61 % SOLUTION: Performed by: HOSPITALIST

## 2023-03-07 PROCEDURE — 85027 COMPLETE CBC AUTOMATED: CPT | Performed by: NURSE PRACTITIONER

## 2023-03-07 PROCEDURE — 82962 GLUCOSE BLOOD TEST: CPT

## 2023-03-07 PROCEDURE — 25010000002 MORPHINE PER 10 MG: Performed by: NURSE PRACTITIONER

## 2023-03-07 PROCEDURE — 83605 ASSAY OF LACTIC ACID: CPT | Performed by: NURSE PRACTITIONER

## 2023-03-07 PROCEDURE — 25010000002 CEFTRIAXONE PER 250 MG: Performed by: NURSE PRACTITIONER

## 2023-03-07 PROCEDURE — 80048 BASIC METABOLIC PNL TOTAL CA: CPT | Performed by: NURSE PRACTITIONER

## 2023-03-07 PROCEDURE — 74178 CT ABD&PLV WO CNTR FLWD CNTR: CPT

## 2023-03-07 PROCEDURE — 84484 ASSAY OF TROPONIN QUANT: CPT | Performed by: NURSE PRACTITIONER

## 2023-03-07 PROCEDURE — 36415 COLL VENOUS BLD VENIPUNCTURE: CPT | Performed by: NURSE PRACTITIONER

## 2023-03-07 PROCEDURE — 63710000001 INSULIN LISPRO (HUMAN) PER 5 UNITS: Performed by: NURSE PRACTITIONER

## 2023-03-07 PROCEDURE — 63710000001: Performed by: HOSPITALIST

## 2023-03-07 RX ORDER — PHENAZOPYRIDINE HYDROCHLORIDE 200 MG/1
200 TABLET, FILM COATED ORAL
Status: DISCONTINUED | OUTPATIENT
Start: 2023-03-07 | End: 2023-03-08 | Stop reason: HOSPADM

## 2023-03-07 RX ORDER — ESCITALOPRAM OXALATE 20 MG/1
20 TABLET ORAL DAILY
Status: DISCONTINUED | OUTPATIENT
Start: 2023-03-07 | End: 2023-03-08 | Stop reason: HOSPADM

## 2023-03-07 RX ORDER — TAMSULOSIN HYDROCHLORIDE 0.4 MG/1
0.4 CAPSULE ORAL DAILY
Status: DISCONTINUED | OUTPATIENT
Start: 2023-03-07 | End: 2023-03-08 | Stop reason: HOSPADM

## 2023-03-07 RX ORDER — MORPHINE SULFATE 2 MG/ML
2 INJECTION, SOLUTION INTRAMUSCULAR; INTRAVENOUS ONCE
Status: COMPLETED | OUTPATIENT
Start: 2023-03-07 | End: 2023-03-07

## 2023-03-07 RX ORDER — SODIUM CHLORIDE 9 MG/ML
75 INJECTION, SOLUTION INTRAVENOUS CONTINUOUS
Status: DISCONTINUED | OUTPATIENT
Start: 2023-03-07 | End: 2023-03-07

## 2023-03-07 RX ORDER — DIAZEPAM 5 MG/1
5 TABLET ORAL EVERY 8 HOURS PRN
Status: DISCONTINUED | OUTPATIENT
Start: 2023-03-07 | End: 2023-03-08 | Stop reason: HOSPADM

## 2023-03-07 RX ADMIN — DIAZEPAM 5 MG: 5 TABLET ORAL at 10:46

## 2023-03-07 RX ADMIN — INSULIN ASPART 34 UNITS: 100 INJECTION, SUSPENSION SUBCUTANEOUS at 20:17

## 2023-03-07 RX ADMIN — DIAZEPAM 5 MG: 5 TABLET ORAL at 20:17

## 2023-03-07 RX ADMIN — IOPAMIDOL 100 ML: 612 INJECTION, SOLUTION INTRAVENOUS at 12:55

## 2023-03-07 RX ADMIN — TAMSULOSIN HYDROCHLORIDE 0.4 MG: 0.4 CAPSULE ORAL at 10:43

## 2023-03-07 RX ADMIN — PHENAZOPYRIDINE 200 MG: 200 TABLET ORAL at 10:43

## 2023-03-07 RX ADMIN — PHENAZOPYRIDINE 200 MG: 200 TABLET ORAL at 18:00

## 2023-03-07 RX ADMIN — DIAZEPAM 5 MG: 5 TABLET ORAL at 02:41

## 2023-03-07 RX ADMIN — SODIUM CHLORIDE 75 ML/HR: 9 INJECTION, SOLUTION INTRAVENOUS at 08:16

## 2023-03-07 RX ADMIN — ESCITALOPRAM 20 MG: 20 TABLET, FILM COATED ORAL at 08:16

## 2023-03-07 RX ADMIN — ACETAMINOPHEN 650 MG: 325 TABLET, FILM COATED ORAL at 22:35

## 2023-03-07 RX ADMIN — CEFTRIAXONE SODIUM 1 G: 1 INJECTION, POWDER, FOR SOLUTION INTRAMUSCULAR; INTRAVENOUS at 18:00

## 2023-03-07 RX ADMIN — MORPHINE SULFATE 2 MG: 2 INJECTION, SOLUTION INTRAMUSCULAR; INTRAVENOUS at 02:27

## 2023-03-07 RX ADMIN — INSULIN LISPRO 6 UNITS: 100 INJECTION, SOLUTION INTRAVENOUS; SUBCUTANEOUS at 13:44

## 2023-03-07 NOTE — CONSULTS
FIRST UROLOGY CONSULT      Patient Identification:  NAME:  Isaiah Bruno  Age:  72 y.o.   Sex:  male   :  1950   MRN:  [id number]       Chief complaint: Hematuria.      History of present illness:  H/o gross hematuria x 24 hours, dysuria. OVS. Cath placed and removed in ER. Small residual. No h/o OVS or hematuria prior. Dysuria improving on Rocephin.  No F/C, no N/V.        Past medical history:  Past Medical History:   Diagnosis Date   • Anxiety    • Diabetes mellitus (HCC)    • Hypertension    • Osteoarthritis    • Premature ventricular contractions        Past surgical history:  Past Surgical History:   Procedure Laterality Date   • CATARACT EXTRACTION     • EYE CAPSULOTOMY WITH LASER     • FINGER TENDON REPAIR      right ring finger   • HIATAL HERNIA REPAIR     • KNEE ARTHROSCOPY         Allergies:  Patient has no known allergies.    Home medications:  Medications Prior to Admission   Medication Sig Dispense Refill Last Dose   • amLODIPine-benazepril (LOTREL) 10-40 MG per capsule Take 1 capsule by mouth Daily.   3/6/2023   • Cholecalciferol 50 MCG (2000 UT) tablet Take  by mouth.   3/6/2023   • escitalopram (LEXAPRO) 10 MG tablet 2 tablets Daily.   3/6/2023   • insulin aspart protamine & aspart (NOVOLOG) 70/30 100 unit/mL Inject 45 units under the skin into the appropriate area as directed. (Patient taking differently: Inject 45 units under the skin into the appropriate area as directed.    Pt will sometimes take more or less depending on what BG is.) 3 mL 6 3/5/2023   • Lutein 10 MG tablet Take  by mouth.   3/6/2023   • Valium 5 MG tablet Take 1 tablet by mouth Every 8 (Eight) Hours As Needed for Anxiety. 270 tablet 0 3/6/2023   • hydrochlorothiazide (HYDRODIURIL) 12.5 MG tablet Take 1 tablet by mouth daily. TK 1 T PO QD 90 tablet 3    • meloxicam (Mobic) 15 MG tablet Take 1 tablet by mouth Daily. 90 tablet 1    • PARoxetine (PAXIL) 20 MG tablet Take 1 tablet by mouth Daily.            Hospital medications:  cefTRIAXone, 1 g, Intravenous, Q24H  escitalopram, 20 mg, Oral, Daily  insulin lispro, 0-9 Units, Subcutaneous, TID AC      sodium chloride, 75 mL/hr      •  acetaminophen  •  aluminum-magnesium hydroxide-simethicone  •  dextrose  •  dextrose  •  diazePAM  •  glucagon (human recombinant)  •  melatonin  •  nitroglycerin  •  ondansetron **OR** ondansetron  •  sodium chloride    Family history:  Family History   Problem Relation Age of Onset   • Stroke Mother    • Diabetes Mother    • Anxiety disorder Mother    • No Known Problems Father        Social history:  Social History     Tobacco Use   • Smoking status: Every Day     Packs/day: 1.00     Years: 50.00     Pack years: 50.00     Types: Cigarettes   • Smokeless tobacco: Never   Vaping Use   • Vaping Use: Never used   Substance Use Topics   • Alcohol use: No   • Drug use: Never       Review of systems:      Positive for:  Hematuria.    Negative for:  Fever.      Objective:  TMax 24 hours:   Temp (24hrs), Av.5 °F (36.9 °C), Min:98.4 °F (36.9 °C), Max:98.6 °F (37 °C)      Vitals Ranges:   Temp:  [98.4 °F (36.9 °C)-98.6 °F (37 °C)] 98.5 °F (36.9 °C)  Heart Rate:  [] 82  Resp:  [16-26] 18  BP: (109-155)/(64-87) 145/71    Intake/Output Last 3 shifts:  I/O last 3 completed shifts:  In: 120 [P.O.:120]  Out: 30 [Urine:30]     Physical Exam:    General Appearance:    Alert, cooperative, NAD   HEENT:     Back:      Lungs:       Heart:     Abdomen:      :     Extremities:    Lymphatic:    Skin:    Neuro/Psych:   Orientation intact, mood/affect pleasant, no focal findings       Results review:   I reviewed the patient's new clinical results.    Data review:  Lab Results (last 24 hours)     Procedure Component Value Units Date/Time    Basic Metabolic Panel [[id number]] Collected: 23    Specimen: Blood Updated: 23    CBC (No Diff) [[id number]]  (Abnormal) Collected: 23    Specimen: Blood Updated: 23      WBC 12.01 10*3/mm3      RBC 5.99 10*6/mm3      Hemoglobin 17.6 g/dL      Hematocrit 52.9 %      MCV 88.3 fL      MCH 29.4 pg      MCHC 33.3 g/dL      RDW 12.5 %      RDW-SD 40.6 fl      MPV 11.4 fL      Platelets 269 10*3/mm3     High Sensitivity Troponin T [[id number]]  (Abnormal) Collected: 03/07/23 0047    Specimen: Blood Updated: 03/07/23 0118     HS Troponin T 45 ng/L     Narrative:      High Sensitive Troponin T Reference Range:  <10.0 ng/L- Negative Female for AMI  <15.0 ng/L- Negative Male for AMI  >=10 - Abnormal Female indicating possible myocardial injury.  >=15 - Abnormal Male indicating possible myocardial injury.   Clinicians would have to utilize clinical acumen, EKG, Troponin, and serial changes to determine if it is an Acute Myocardial Infarction or myocardial injury due to an underlying chronic condition.         Lactic Acid, Plasma [[id number]]  (Normal) Collected: 03/07/23 0047    Specimen: Blood Updated: 03/07/23 0117     Lactate 1.8 mmol/L     Blood Culture - Blood, Wrist, Left [[id number]] Collected: 03/07/23 0047    Specimen: Blood from Wrist, Left Updated: 03/07/23 0055    Blood Culture - Blood, Arm, Left [[id number]] Collected: 03/07/23 0047    Specimen: Blood from Arm, Left Updated: 03/07/23 0055    Procalcitonin [[id number]]  (Normal) Collected: 03/1950    Specimen: Blood Updated: 03/06/23 2146     Procalcitonin 0.09 ng/mL     Narrative:      As a Marker for Sepsis (Non-Neonates):    1. <0.5 ng/mL represents a low risk of severe sepsis and/or septic shock.  2. >2 ng/mL represents a high risk of severe sepsis and/or septic shock.    As a Marker for Lower Respiratory Tract Infections that require antibiotic therapy:    PCT on Admission    Antibiotic Therapy       6-12 Hrs later    >0.5                Strongly Recommended  >0.25 - <0.5        Recommended   0.1 - 0.25          Discouraged              Remeasure/reassess PCT  <0.1                Strongly Discouraged     Remeasure/reassess  "PCT    As 28 day mortality risk marker: \"Change in Procalcitonin Result\" (>80% or <=80%) if Day 0 (or Day 1) and Day 4 values are available. Refer to [email or web address]://[email or web address]-pct-calculator.com    Change in PCT <=80%  A decrease of PCT levels below or equal to 80% defines a positive change in PCT test result representing a higher risk for 28-day all-cause mortality of patients diagnosed with severe sepsis for septic shock.    Change in PCT >80%  A decrease of PCT levels of more than 80% defines a negative change in PCT result representing a lower risk for 28-day all-cause mortality of patients diagnosed with severe sepsis or septic shock.       High Sensitivity Troponin T 2Hr [[id number]]  (Abnormal) Collected: 03/1950    Specimen: Blood Updated: 03/06/23 2112     HS Troponin T 54 ng/L      Troponin T Delta -4 ng/L     Narrative:      High Sensitive Troponin T Reference Range:  <10.0 ng/L- Negative Female for AMI  <15.0 ng/L- Negative Male for AMI  >=10 - Abnormal Female indicating possible myocardial injury.  >=15 - Abnormal Male indicating possible myocardial injury.   Clinicians would have to utilize clinical acumen, EKG, Troponin, and serial changes to determine if it is an Acute Myocardial Infarction or myocardial injury due to an underlying chronic condition.         Hemoglobin A1c [[id number]]  (Abnormal) Collected: 03/06/23 1626    Specimen: Blood Updated: 03/06/23 2044     Hemoglobin A1C 8.00 %     Narrative:      Hemoglobin A1C Ranges:    Increased Risk for Diabetes  5.7% to 6.4%  Diabetes                     >= 6.5%  Diabetic Goal                < 7.0%    POC Glucose Once [[id number]]  (Normal) Collected: 03/06/23 1939    Specimen: Blood Updated: 03/06/23 1941     Glucose 111 mg/dL      Comment: Meter: [id number] : [email or web address] Henrry Parikh RN       Urine Culture - Urine, Urine, Clean Catch [[id number]] Collected: 03/06/23 1708    Specimen: Urine, Clean Catch Updated: 03/06/23 1909    POC Glucose Once " [[id number]]  (Abnormal) Collected: 03/06/23 1846    Specimen: Blood Updated: 03/06/23 1847     Glucose 54 mg/dL      Comment: NADER CORBIN RN Notified R and V Meter: [id number] : [id number] Tristen COTTON       High Sensitivity Troponin T [[id number]]  (Abnormal) Collected: 03/06/23 1738    Specimen: Blood Updated: 03/06/23 1816     HS Troponin T 58 ng/L     Narrative:      High Sensitive Troponin T Reference Range:  <10.0 ng/L- Negative Female for AMI  <15.0 ng/L- Negative Male for AMI  >=10 - Abnormal Female indicating possible myocardial injury.  >=15 - Abnormal Male indicating possible myocardial injury.   Clinicians would have to utilize clinical acumen, EKG, Troponin, and serial changes to determine if it is an Acute Myocardial Infarction or myocardial injury due to an underlying chronic condition.         Goode Draw [[id number]] Collected: 03/06/23 1626    Specimen: Blood Updated: 03/06/23 1731    Narrative:      The following orders were created for panel order Goode Draw.  Procedure                               Abnormality         Status                     ---------                               -----------         ------                     Green Top (Gel)[[id number]]                                  Final result               Lavender Top[[id number]]                                     Final result               Gold Top - SST[[id number]]                                   Final result               Light Blue Top[[id number]]                                   Final result                 Please view results for these tests on the individual orders.    Lavender Top [[id number]] Collected: 03/06/23 1626    Specimen: Blood Updated: 03/06/23 1731     Extra Tube hold for add-on     Comment: Auto resulted       Light Blue Top [[id number]] Collected: 03/06/23 1626    Specimen: Blood Updated: 03/06/23 1731     Extra Tube Hold for add-ons.     Comment: Auto resulted       Green Top (Gel) [[id number]] Collected:  03/06/23 1626    Specimen: Blood Updated: 03/06/23 1731     Extra Tube Hold for add-ons.     Comment: Auto resulted.       Gold Top - SST [[id number]] Collected: 03/06/23 1626    Specimen: Blood Updated: 03/06/23 1731     Extra Tube Hold for add-ons.     Comment: Auto resulted.       Urinalysis, Microscopic Only - Urine, Clean Catch [[id number]]  (Abnormal) Collected: 03/06/23 1708    Specimen: Urine, Clean Catch Updated: 03/06/23 1725     RBC, UA Too Numerous to Count /HPF      WBC, UA Too Numerous to Count /HPF      Bacteria, UA 2+ /HPF      Squamous Epithelial Cells, UA 3-6 /HPF      Hyaline Casts, UA None Seen /LPF      Methodology Automated Microscopy    Urinalysis With Microscopic If Indicated (No Culture) - Urine, Clean Catch [[id number]]  (Abnormal) Collected: 03/06/23 1708    Specimen: Urine, Clean Catch Updated: 03/06/23 1724     Color, UA Dark Yellow     Appearance, UA Cloudy     pH, UA 6.5     Specific Gravity, UA 1.025     Glucose, UA Negative     Ketones, UA 15 mg/dL (1+)     Bilirubin, UA Negative     Blood, UA Large (3+)     Protein, UA >=300 mg/dL (3+)     Leuk Esterase, UA Moderate (2+)     Nitrite, UA Positive     Urobilinogen, UA 2.0 E.U./dL    POC Glucose Once [[id number]]  (Abnormal) Collected: 03/06/23 1721    Specimen: Blood Updated: 03/06/23 1723     Glucose 38 mg/dL      Comment: NADER CORBIN RN Notified R and V Meter: [id number] : [id number] Tristen COTTON       Comprehensive Metabolic Panel [[id number]]  (Abnormal) Collected: 03/06/23 1626    Specimen: Blood Updated: 03/06/23 1718     Glucose 43 mg/dL      BUN 18 mg/dL      Creatinine 1.25 mg/dL      Sodium 138 mmol/L      Potassium 4.1 mmol/L      Comment: Slight hemolysis detected by analyzer. Results may be affected.        Chloride 105 mmol/L      CO2 24.0 mmol/L      Calcium 9.7 mg/dL      Total Protein 7.1 g/dL      Albumin 4.3 g/dL      ALT (SGPT) 12 U/L      AST (SGOT) 14 U/L      Alkaline Phosphatase 100 U/L      Total Bilirubin 0.5  mg/dL      Globulin 2.8 gm/dL      A/G Ratio 1.5 g/dL      BUN/Creatinine Ratio 14.4     Anion Gap 9.0 mmol/L      eGFR 60.4 mL/min/1.73     Narrative:      GFR Normal >60  Chronic Kidney Disease <60  Kidney Failure <15    The GFR formula is only valid for adults with stable renal function between ages 18 and 70.    Single High Sensitivity Troponin T [[id number]]  (Abnormal) Collected: 03/06/23 1626    Specimen: Blood Updated: 03/06/23 1703     HS Troponin T 66 ng/L     Narrative:      High Sensitive Troponin T Reference Range:  <10.0 ng/L- Negative Female for AMI  <15.0 ng/L- Negative Male for AMI  >=10 - Abnormal Female indicating possible myocardial injury.  >=15 - Abnormal Male indicating possible myocardial injury.   Clinicians would have to utilize clinical acumen, EKG, Troponin, and serial changes to determine if it is an Acute Myocardial Infarction or myocardial injury due to an underlying chronic condition.         CBC & Differential [[id number]]  (Abnormal) Collected: 03/06/23 1626    Specimen: Blood Updated: 03/06/23 1644    Narrative:      The following orders were created for panel order CBC & Differential.  Procedure                               Abnormality         Status                     ---------                               -----------         ------                     CBC Auto Differential[[id number]]        Abnormal            Final result                 Please view results for these tests on the individual orders.    CBC Auto Differential [[id number]]  (Abnormal) Collected: 03/06/23 1626    Specimen: Blood Updated: 03/06/23 1644     WBC 14.39 10*3/mm3      RBC 5.94 10*6/mm3      Hemoglobin 18.0 g/dL      Hematocrit 51.9 %      MCV 87.4 fL      MCH 30.3 pg      MCHC 34.7 g/dL      RDW 12.5 %      RDW-SD 40.5 fl      MPV 11.3 fL      Platelets 304 10*3/mm3      Neutrophil % 81.8 %      Lymphocyte % 11.0 %      Monocyte % 6.3 %      Eosinophil % 0.1 %      Basophil % 0.4 %      Immature Grans %  0.4 %      Neutrophils, Absolute 11.76 10*3/mm3      Lymphocytes, Absolute 1.58 10*3/mm3      Monocytes, Absolute 0.91 10*3/mm3      Eosinophils, Absolute 0.02 10*3/mm3      Basophils, Absolute 0.06 10*3/mm3      Immature Grans, Absolute 0.06 10*3/mm3      nRBC 0.0 /100 WBC            Imaging:  Imaging Results (Last 24 Hours)     Procedure Component Value Units Date/Time    XR Chest 1 View [[id number]] Collected: 03/06/23 1946     Updated: 03/06/23 1950    Narrative:      Emergency portable view of the chest on 03/06/2023     CLINICAL HISTORY: shortness of breath, urinary retention and mild chest  pain     This is correlated to a prior chest x-ray on 11/18/2022.     FINDINGS: The cardiomediastinal silhouette and the pulmonary vasculature  are within normal limits. The lungs are clear. The costophrenic angles  are sharp.       Impression:      1. No active disease is seen in the chest with no significant change  when compared to prior portable chest x-ray on 11/18/2022. The etiology  of this patient's shortness of breath and chest pain is not established  on this exam.     This report was finalized on 3/6/2023 7:47 PM by Dr. Tiago Boyd M.D.                Assessment:       Urinary tract infection with hematuria, site unspecified    Type 2 diabetes mellitus with diabetic polyneuropathy, with long-term current use of insulin (HCC)    Essential hypertension    BPH (benign prostatic hyperplasia)    TOMMY (generalized anxiety disorder)    GERD (gastroesophageal reflux disease)    Gross hematuria.  Acute cystitis.      Plan:     Ct scan hematuria protocol.  Await final ucx.  Start Flomax.  Pyridium.  Will follow.      Gwyn Lechuga MD  03/07/23  08:03 EST

## 2023-03-07 NOTE — H&P
Patient Name:  Isaiah Bruno  YOB: 1950  MRN:  [id number]  Admit Date:  3/6/2023  Patient Care Team:  Patti Rees MD as PCP - General (Family Medicine)      Subjective   History Present Illness     Chief Complaint   Patient presents with   • Urinary Retention     History of Present Illness   Mr. Bruno is a 72 y.o. smoker with a history of insulin dependent type 2 diabetes mellitus, GERD, TOMMY, HTN, and BPH that presents to [facility] complaining of urinary retention. He states he awaken this morning and was unable to urinate. He does report blood in his urine last night. He feels like his bladder is full.  He also endorses chest pressure and occasional dyspnea. He denies fevers, chills, nausea, vomiting, or diarrhea.  UA shows 2+ bacteria, TMTC WBC, and positive nitrates.  Labs show a high sensitivity troponin of 66, 58, and 54 respectively, white count 14.39, and procal 0.09. He has been admitted to our service for further treatment.    Review of Systems   Constitutional: Negative for chills and fever.   HENT: Negative for congestion and rhinorrhea.    Eyes: Negative for photophobia and visual disturbance.   Respiratory: Negative for cough and shortness of breath.    Cardiovascular: Negative for chest pain and palpitations.   Gastrointestinal: Negative for constipation, diarrhea, nausea and vomiting.   Endocrine: Negative for cold intolerance and heat intolerance.   Genitourinary: Positive for difficulty urinating and hematuria. Negative for dysuria.   Musculoskeletal: Negative for gait problem and joint swelling.   Skin: Negative for rash and wound.   Neurological: Negative for dizziness, light-headedness and headaches.   Psychiatric/Behavioral: Negative for sleep disturbance and suicidal ideas.        Personal History     Past Medical History:   Diagnosis Date   • Anxiety    • Diabetes mellitus (HCC)    • Hypertension    • Osteoarthritis    • Premature ventricular  contractions      Past Surgical History:   Procedure Laterality Date   • CATARACT EXTRACTION     • EYE CAPSULOTOMY WITH LASER     • FINGER TENDON REPAIR      right ring finger   • HIATAL HERNIA REPAIR  1978   • KNEE ARTHROSCOPY       Family History   Problem Relation Age of Onset   • Stroke Mother    • Diabetes Mother    • Anxiety disorder Mother    • No Known Problems Father      Social History     Tobacco Use   • Smoking status: Every Day     Packs/day: 1.00     Years: 50.00     Pack years: 50.00     Types: Cigarettes   • Smokeless tobacco: Never   Substance Use Topics   • Alcohol use: No   • Drug use: Never     No current facility-administered medications on file prior to encounter.     Current Outpatient Medications on File Prior to Encounter   Medication Sig Dispense Refill   • amLODIPine-benazepril (LOTREL) 10-40 MG per capsule Take 1 capsule by mouth Daily.     • Cholecalciferol 50 MCG (2000 UT) tablet Take  by mouth.     • escitalopram (LEXAPRO) 10 MG tablet 20 mg Daily.     • hydrochlorothiazide (HYDRODIURIL) 12.5 MG tablet Take 1 tablet by mouth daily. TK 1 T PO QD 90 tablet 3   • insulin aspart protamine & aspart (NOVOLOG) 70/30 100 unit/mL Inject 45 units under the skin into the appropriate area as directed. (Patient taking differently: Inject 45 units under the skin into the appropriate area as directed.    Pt will sometimes take more or less depending on what BG is.) 3 mL 6   • Lutein 10 MG tablet Take  by mouth.     • meloxicam (Mobic) 15 MG tablet Take 1 tablet by mouth Daily. 90 tablet 1   • PARoxetine (PAXIL) 20 MG tablet Take 1 tablet by mouth Daily.     • Valium 5 MG tablet Take 1 tablet by mouth Every 8 (Eight) Hours As Needed for Anxiety. 270 tablet 0     No Known Allergies    Objective    Objective     Vital Signs  Temp:  [98.4 °F (36.9 °C)] 98.4 °F (36.9 °C)  Heart Rate:  [] 77  Resp:  [16-26] 16  BP: (109-138)/(64-81) 138/72  SpO2:  [94 %-96 %] 96 %  on   ;   Device (Oxygen Therapy):  room air  Body mass index is 29.84 kg/m².    Physical Exam  Vitals and nursing note reviewed.   Constitutional:       General: He is not in acute distress.     Appearance: He is well-developed. He is not toxic-appearing.   HENT:      Head: Normocephalic and atraumatic.      Right Ear: Decreased hearing noted.      Left Ear: Decreased hearing noted.   Eyes:      General: No scleral icterus.        Right eye: No discharge.         Left eye: No discharge.      Conjunctiva/sclera: Conjunctivae normal.   Neck:      Vascular: No JVD.   Cardiovascular:      Rate and Rhythm: Normal rate and regular rhythm.      Heart sounds: Normal heart sounds. No murmur heard.    No friction rub. No gallop.   Pulmonary:      Effort: Pulmonary effort is normal. No respiratory distress.      Breath sounds: Normal breath sounds. No wheezing or rales.   Abdominal:      General: Bowel sounds are normal. There is no distension.      Palpations: Abdomen is soft.      Tenderness: There is no abdominal tenderness. There is no guarding.   Musculoskeletal:         General: No tenderness or deformity. Normal range of motion.      Cervical back: Normal range of motion and neck supple.   Skin:     General: Skin is warm and dry.      Capillary Refill: Capillary refill takes less than 2 seconds.   Neurological:      Mental Status: He is alert and oriented to person, place, and time.   Psychiatric:         Behavior: Behavior normal.     Results Review:  I reviewed the patient's new clinical results.  I reviewed the patient's new imaging results and agree with the interpretation.  I reviewed the patient's other test results and agree with the interpretation  I personally viewed and interpreted the patient's EKG/Telemetry data    Lab Results (last 24 hours)     Procedure Component Value Units Date/Time    CBC & Differential [[id number]]  (Abnormal) Collected: 03/06/23 1626    Specimen: Blood Updated: 03/06/23 1644    Narrative:      The following orders were  created for panel order CBC & Differential.  Procedure                               Abnormality         Status                     ---------                               -----------         ------                     CBC Auto Differential[[id number]]        Abnormal            Final result                 Please view results for these tests on the individual orders.    Comprehensive Metabolic Panel [[id number]]  (Abnormal) Collected: 03/06/23 1626    Specimen: Blood Updated: 03/06/23 1718     Glucose 43 mg/dL      BUN 18 mg/dL      Creatinine 1.25 mg/dL      Sodium 138 mmol/L      Potassium 4.1 mmol/L      Comment: Slight hemolysis detected by analyzer. Results may be affected.        Chloride 105 mmol/L      CO2 24.0 mmol/L      Calcium 9.7 mg/dL      Total Protein 7.1 g/dL      Albumin 4.3 g/dL      ALT (SGPT) 12 U/L      AST (SGOT) 14 U/L      Alkaline Phosphatase 100 U/L      Total Bilirubin 0.5 mg/dL      Globulin 2.8 gm/dL      A/G Ratio 1.5 g/dL      BUN/Creatinine Ratio 14.4     Anion Gap 9.0 mmol/L      eGFR 60.4 mL/min/1.73     Narrative:      GFR Normal >60  Chronic Kidney Disease <60  Kidney Failure <15    The GFR formula is only valid for adults with stable renal function between ages 18 and 70.    Single High Sensitivity Troponin T [[id number]]  (Abnormal) Collected: 03/06/23 1626    Specimen: Blood Updated: 03/06/23 1703     HS Troponin T 66 ng/L     Narrative:      High Sensitive Troponin T Reference Range:  <10.0 ng/L- Negative Female for AMI  <15.0 ng/L- Negative Male for AMI  >=10 - Abnormal Female indicating possible myocardial injury.  >=15 - Abnormal Male indicating possible myocardial injury.   Clinicians would have to utilize clinical acumen, EKG, Troponin, and serial changes to determine if it is an Acute Myocardial Infarction or myocardial injury due to an underlying chronic condition.         CBC Auto Differential [[id number]]  (Abnormal) Collected: 03/06/23 1626    Specimen: Blood Updated:  03/06/23 1644     WBC 14.39 10*3/mm3      RBC 5.94 10*6/mm3      Hemoglobin 18.0 g/dL      Hematocrit 51.9 %      MCV 87.4 fL      MCH 30.3 pg      MCHC 34.7 g/dL      RDW 12.5 %      RDW-SD 40.5 fl      MPV 11.3 fL      Platelets 304 10*3/mm3      Neutrophil % 81.8 %      Lymphocyte % 11.0 %      Monocyte % 6.3 %      Eosinophil % 0.1 %      Basophil % 0.4 %      Immature Grans % 0.4 %      Neutrophils, Absolute 11.76 10*3/mm3      Lymphocytes, Absolute 1.58 10*3/mm3      Monocytes, Absolute 0.91 10*3/mm3      Eosinophils, Absolute 0.02 10*3/mm3      Basophils, Absolute 0.06 10*3/mm3      Immature Grans, Absolute 0.06 10*3/mm3      nRBC 0.0 /100 WBC     Hemoglobin A1c [[id number]]  (Abnormal) Collected: 03/06/23 1626    Specimen: Blood Updated: 03/06/23 2044     Hemoglobin A1C 8.00 %     Narrative:      Hemoglobin A1C Ranges:    Increased Risk for Diabetes  5.7% to 6.4%  Diabetes                     >= 6.5%  Diabetic Goal                < 7.0%    Urinalysis With Microscopic If Indicated (No Culture) - Urine, Clean Catch [[id number]]  (Abnormal) Collected: 03/06/23 1708    Specimen: Urine, Clean Catch Updated: 03/06/23 1724     Color, UA Dark Yellow     Appearance, UA Cloudy     pH, UA 6.5     Specific Gravity, UA 1.025     Glucose, UA Negative     Ketones, UA 15 mg/dL (1+)     Bilirubin, UA Negative     Blood, UA Large (3+)     Protein, UA >=300 mg/dL (3+)     Leuk Esterase, UA Moderate (2+)     Nitrite, UA Positive     Urobilinogen, UA 2.0 E.U./dL    Urinalysis, Microscopic Only - Urine, Clean Catch [[id number]]  (Abnormal) Collected: 03/06/23 1708    Specimen: Urine, Clean Catch Updated: 03/06/23 1725     RBC, UA Too Numerous to Count /HPF      WBC, UA Too Numerous to Count /HPF      Bacteria, UA 2+ /HPF      Squamous Epithelial Cells, UA 3-6 /HPF      Hyaline Casts, UA None Seen /LPF      Methodology Automated Microscopy    Urine Culture - Urine, Urine, Clean Catch [[id number]] Collected: 03/06/23 6543    Specimen:  Urine, Clean Catch Updated: 03/06/23 1909    POC Glucose Once [[id number]]  (Abnormal) Collected: 03/06/23 1721    Specimen: Blood Updated: 03/06/23 1723     Glucose 38 mg/dL      Comment: NADER CORBIN RN Notified R and V Meter: [id number] : [id number] [place] Fried[place] EDT       High Sensitivity Troponin T [[id number]]  (Abnormal) Collected: 03/06/23 1738    Specimen: Blood Updated: 03/06/23 1816     HS Troponin T 58 ng/L     Narrative:      High Sensitive Troponin T Reference Range:  <10.0 ng/L- Negative Female for AMI  <15.0 ng/L- Negative Male for AMI  >=10 - Abnormal Female indicating possible myocardial injury.  >=15 - Abnormal Male indicating possible myocardial injury.   Clinicians would have to utilize clinical acumen, EKG, Troponin, and serial changes to determine if it is an Acute Myocardial Infarction or myocardial injury due to an underlying chronic condition.         POC Glucose Once [[id number]]  (Abnormal) Collected: 03/06/23 1846    Specimen: Blood Updated: 03/06/23 1847     Glucose 54 mg/dL      Comment: NADER CORBIN RN Notified R and V Meter: [id number] : [id number] [place] Frieda EDT       POC Glucose Once [[id number]]  (Normal) Collected: 03/06/23 1939    Specimen: Blood Updated: 03/06/23 1941     Glucose 111 mg/dL      Comment: Meter: [id number] : [id number] Henrry Parikh RN       High Sensitivity Troponin T 2Hr [[id number]]  (Abnormal) Collected: 03/1950    Specimen: Blood Updated: 03/06/23 2112     HS Troponin T 54 ng/L      Troponin T Delta -4 ng/L     Narrative:      High Sensitive Troponin T Reference Range:  <10.0 ng/L- Negative Female for AMI  <15.0 ng/L- Negative Male for AMI  >=10 - Abnormal Female indicating possible myocardial injury.  >=15 - Abnormal Male indicating possible myocardial injury.   Clinicians would have to utilize clinical acumen, EKG, Troponin, and serial changes to determine if it is an Acute Myocardial Infarction or myocardial injury due to an underlying chronic  "condition.         Procalcitonin [[id number]]  (Normal) Collected: 03/1950    Specimen: Blood Updated: 03/06/23 2146     Procalcitonin 0.09 ng/mL     Narrative:      As a Marker for Sepsis (Non-Neonates):    1. <0.5 ng/mL represents a low risk of severe sepsis and/or septic shock.  2. >2 ng/mL represents a high risk of severe sepsis and/or septic shock.    As a Marker for Lower Respiratory Tract Infections that require antibiotic therapy:    PCT on Admission    Antibiotic Therapy       6-12 Hrs later    >0.5                Strongly Recommended  >0.25 - <0.5        Recommended   0.1 - 0.25          Discouraged              Remeasure/reassess PCT  <0.1                Strongly Discouraged     Remeasure/reassess PCT    As 28 day mortality risk marker: \"Change in Procalcitonin Result\" (>80% or <=80%) if Day 0 (or Day 1) and Day 4 values are available. Refer to http://www.[facility]-pct-calculator.com    Change in PCT <=80%  A decrease of PCT levels below or equal to 80% defines a positive change in PCT test result representing a higher risk for 28-day all-cause mortality of patients diagnosed with severe sepsis for septic shock.    Change in PCT >80%  A decrease of PCT levels of more than 80% defines a negative change in PCT result representing a lower risk for 28-day all-cause mortality of patients diagnosed with severe sepsis or septic shock.             Imaging Results (Last 24 Hours)     Procedure Component Value Units Date/Time    XR Chest 1 View [[id number]] Collected: 03/06/23 1946     Updated: 03/06/23 1950    Narrative:      Emergency portable view of the chest on 03/06/2023     CLINICAL HISTORY: shortness of breath, urinary retention and mild chest  pain     This is correlated to a prior chest x-ray on 11/18/2022.     FINDINGS: The cardiomediastinal silhouette and the pulmonary vasculature  are within normal limits. The lungs are clear. The costophrenic angles  are sharp.       Impression:      1. No active " disease is seen in the chest with no significant change  when compared to prior portable chest x-ray on 11/18/2022. The etiology  of this patient's shortness of breath and chest pain is not established  on this exam.     This report was finalized on 3/6/2023 7:47 PM by Dr. Tiago oByd M.D.                 ECG 12 Lead Chest Pain    (Results Pending)        Assessment/Plan     Active Hospital Problems    Diagnosis  POA   • **Urinary tract infection with hematuria, site unspecified [N39.0, R31.9]  Yes   • GERD (gastroesophageal reflux disease) [K21.9]  Yes   • TOMMY (generalized anxiety disorder) [F41.1]  Yes   • BPH (benign prostatic hyperplasia) [N40.0]  Yes   • Type 2 diabetes mellitus with diabetic polyneuropathy, with long-term current use of insulin (HCC) [E11.42, Z79.4]  Not Applicable   • Essential hypertension [I10]  Yes      Resolved Hospital Problems   No resolved problems to display.       Mr. Bruno is a 72 y.o. smoker with a history of type 2 DM, HTN, BPH, TOMMY, and GERD who presents urinary retention.     Acute UT with hematuriaI/urinary retention  -Continue IV Rocephin daily  -Urine cultures pending, will de-escalate antibiotics accordingly  -Bladder scan PVR  -Supportive care with IV hydration   -He does endorse hematuria, will consult urology    Chest pain  -Check EKG  -Cardiology consult in a.m.  -Trend troponins     Type 2 diabetes mellitus  -Hypoglycemic upon arrival, now improving   -Accu-Cheks 4 times a day with meals and at bedtime  -Moderate dose correctional factor with hypoglycemic protocol  -Hemoglobin A1c 8.00, resume basal insulin   -Hold oral diabetic medication    Essential hypertension  -Stable, resume home regimen     GERD  -Continue PPI    TOMMY  -Continue Lexapro      I discussed the patient's findings and my recommendations with patient and Dr. Lucas.    VTE Prophylaxis - SCDs.  Code Status - Full code.       EFRAIN Heredia  [facility]  03/06/23  22:03 EST

## 2023-03-07 NOTE — CASE MANAGEMENT/SOCIAL WORK
Continued Stay Note  [facility]     Patient Name: Isaiah Bruno  MRN: [id number]  Today's Date: 3/7/2023    Admit Date: 3/6/2023    Plan: Plan home with spouse.  JULIETTE Verdugo RN   Discharge Plan     Row Name 03/07/23 1047       Plan    Plan Plan home with spouse.  JULIETTE Verdugo RN    Patient/Family in Agreement with Plan yes    Plan Comments FACE SHEET VERIFIED/ IM LETTER SIGNED.  Spoke with pt and pt's spouse  (Kaitlin) at bedside.  Pt's PCP is Dr. Patti Rees.  Pt lives in a single story house with his spouse  ( Kaitlin Bruno  [phone]).  Pt is independent with ADLs.  Pt has a B/P cuff, glucometer, scales and a shower chair for home use if needed.  Pt gets his prescriptions at [facility] ( [address] [place] [address]) or [facility].  Pt denies any issues affording medications.  Pt is not current with HH.  Pt has not been in SNF.  Pt denies any discharge needs.  Pt's spouse will assist pt at home if needed and transport pt home.  Plan home with spouse.   JULIETTE Verdugo RN               Discharge Codes    No documentation.               Expected Discharge Date and Time     Expected Discharge Date Expected Discharge Time    Mar 13, 2023             Magali Verdugo RN

## 2023-03-07 NOTE — CONSULTS
[facility]  Cardiology Consult Note    Patient Identification:  Name: Isaiah Bruno  Age: 72 y.o.  Sex: male  :  1950  MRN: [id number]             Requesting Physician: Dr Lucas    Reason for Consultation / Chief Complaint: chest discomfort/elevated troponin    Spoke with primary RN Jody, was notified by primary RN that patient does not want cardiology to see him and he has acutally told the RN that he does not want any cardiologist to step foot into his room. Jody RN reports she spoke to Dr Jennings and notified him of this. Please reconsult if patient agreeable/for any new concerns. Thank you.       Anaya Key, APRN  3/7/2023, 13:21 EST      EMR Dragon/Transcription:   Dictated utilizing Dragon dictation

## 2023-03-07 NOTE — PLAN OF CARE
Goal Outcome Evaluation:  Plan of Care Reviewed With: patient        Progress: no change  Outcome Evaluation: Patient  admitted  this   shift  with  Hematuria.    Has  voided  once  this  shift  with  no  hematuria   per  patient.  Declined  to  use    Urinal.  Patient   up  to  bathroom,  complained  of  dysuria.  Medication  offered    but  declined  stating  it    does  not  work.  Patient  out  in the  hallway  screaming   for  pain.    Took  much  effort  of  all   unit  staff  to  calm  patient. New  order  received  for  Morphine  x  1.  Same  effective.  Urology  to  see  this  am.   Valium  given  for  increased  anxiety.    Continue  IV   antibiotics.   SR  on the  monitor.   Nursing  will  continue  to monitor.

## 2023-03-07 NOTE — CASE MANAGEMENT/SOCIAL WORK
Discharge Planning Assessment  [facility]     Patient Name: Isaiah Bruno  MRN: [id number]  Today's Date: 3/7/2023    Admit Date: 3/6/2023    Plan: Plan home with spouse.  JULIETTE Verdugo RN   Discharge Needs Assessment     Row Name 03/07/23 1044       Living Environment    People in Home spouse    Name(s) of People in Home Spouse  ( Kaitlin Bruno  [phone])    Current Living Arrangements home    Primary Care Provided by self    Provides Primary Care For no one    Family Caregiver if Needed spouse    Family Caregiver Names Spouse  ( Kaitlin Bruno  [phone])    Quality of Family Relationships helpful;involved;supportive    Able to Return to Prior Arrangements yes    Living Arrangement Comments Pt lives in a single story house with his spouse  ( Kaitlin Bruno  [phone]).       Resource/Environmental Concerns    Resource/Environmental Concerns none    Transportation Concerns none       Transition Planning    Patient/Family Anticipates Transition to home with family    Patient/Family Anticipated Services at Transition none    Transportation Anticipated family or friend will provide;car, drives self       Discharge Needs Assessment    Equipment Currently Used at Home bp cuff;glucometer;scales;shower chair    Concerns to be Addressed no discharge needs identified;denies needs/concerns at this time    Anticipated Changes Related to Illness none    Equipment Needed After Discharge bp cuff;glucometer;scales;shower chair               Discharge Plan     Row Name 03/07/23 1047       Plan    Plan Plan home with spouse.  JULIETTE Verdugo RN    Patient/Family in Agreement with Plan yes    Plan Comments FACE SHEET VERIFIED/ IM LETTER SIGNED.  Spoke with pt and pt's spouse  (Kaitlin) at bedside.  Pt's PCP is Dr. Patti Rees.  Pt lives in a single story house with his spouse  ( Kaitlin Bruno  [phone]).  Pt is independent with ADLs.  Pt has a B/P cuff, glucometer, scales and a shower chair  for home use if needed.  Pt gets his prescriptions at [facility] ( [address] [place] [address]) or [facility].  Pt denies any issues affording medications.  Pt is not current with HH.  Pt has not been in SNF.  Pt denies any discharge needs.  Pt's spouse will assist pt at home if needed and transport pt home.  Plan home with spouse.   JULIETTE Verdugo RN              Continued Care and Services - Admitted Since 3/6/2023    Coordination has not been started for this encounter.       Expected Discharge Date and Time     Expected Discharge Date Expected Discharge Time    Mar 13, 2023          Demographic Summary     Row Name 03/07/23 1043       General Information    Admission Type inpatient    Arrived From emergency department    Required Notices Provided Important Message from Medicare    Referral Source admission list    Reason for Consult discharge planning    Preferred Language English               Functional Status     Row Name 03/07/23 1043       Functional Status    Usual Activity Tolerance good    Current Activity Tolerance good       Functional Status, IADL    Medications independent    Meal Preparation assistive person    Housekeeping assistive person    Laundry assistive person    Shopping assistive person       Mental Status    General Appearance WDL WDL               Psychosocial    No documentation.                Abuse/Neglect    No documentation.                Legal    No documentation.                Substance Abuse    No documentation.                Patient Forms    No documentation.                   Magali Verdugo, RN

## 2023-03-07 NOTE — ED NOTES
"Nursing report ED to floor  Isaiah Bruno  72 y.o.  male    HPI :   Chief Complaint   Patient presents with    Urinary Retention       Admitting doctor:   Jose Lucas MD    Admitting diagnosis:   The primary encounter diagnosis was Urinary tract infection with hematuria, site unspecified. Diagnoses of Hypoglycemia due to insulin, Chest pain, unspecified type, Chronic anxiety, and Type 2 diabetes mellitus with diabetic polyneuropathy, with long-term current use of insulin (HCC) were also pertinent to this visit.    Code status:   Current Code Status       Date Active Code Status Order ID Comments User Context       3/6/2023 2016 CPR (Attempt to Resuscitate) [id number]  Viri Mariee, APRN ED        Question Answer    Code Status (Patient has no pulse and is not breathing) CPR (Attempt to Resuscitate)    Medical Interventions (Patient has pulse or is breathing) Full Support                    Allergies:   Patient has no known allergies.    Isolation:   No active isolations    Intake and Output    Intake/Output Summary (Last 24 hours) at 3/6/2023 2100  Last data filed at 3/6/2023 1915  Gross per 24 hour   Intake --   Output 30 ml   Net -30 ml       Weight:       03/06/23  1621   Weight: 99.8 kg (220 lb)       Most recent vitals:   Vitals:    03/06/23 1621 03/06/23 1710 03/06/23 1711 03/06/23 1815   BP: 109/64 131/81  138/72   BP Location: Left arm      Patient Position: Sitting      Pulse: 84 81     Resp: 18      Temp:       SpO2:   94%    Weight: 99.8 kg (220 lb)      Height: 182.9 cm (72\")          Active LDAs/IV Access:   Lines, Drains & Airways       Active LDAs       Name Placement date Placement time Site Days    Peripheral IV 03/06/23 1831 Right Antecubital 03/06/23  1831  Antecubital  less than 1                    Labs (abnormal labs have a star):   Labs Reviewed   URINALYSIS W/ MICROSCOPIC IF INDICATED (NO CULTURE) - Abnormal; Notable for the following components:       Result Value    Color, UA " Dark Yellow (*)     Appearance, UA Cloudy (*)     Ketones, UA 15 mg/dL (1+) (*)     Blood, UA Large (3+) (*)     Protein, UA >=300 mg/dL (3+) (*)     Leuk Esterase, UA Moderate (2+) (*)     Nitrite, UA Positive (*)     Urobilinogen, UA 2.0 E.U./dL (*)     All other components within normal limits   COMPREHENSIVE METABOLIC PANEL - Abnormal; Notable for the following components:    Glucose 43 (*)     All other components within normal limits    Narrative:     GFR Normal >60  Chronic Kidney Disease <60  Kidney Failure <15    The GFR formula is only valid for adults with stable renal function between ages 18 and 70.   SINGLE HSTROPONIN T - Abnormal; Notable for the following components:    HS Troponin T 66 (*)     All other components within normal limits    Narrative:     High Sensitive Troponin T Reference Range:  <10.0 ng/L- Negative Female for AMI  <15.0 ng/L- Negative Male for AMI  >=10 - Abnormal Female indicating possible myocardial injury.  >=15 - Abnormal Male indicating possible myocardial injury.   Clinicians would have to utilize clinical acumen, EKG, Troponin, and serial changes to determine if it is an Acute Myocardial Infarction or myocardial injury due to an underlying chronic condition.        CBC WITH AUTO DIFFERENTIAL - Abnormal; Notable for the following components:    WBC 14.39 (*)     RBC 5.94 (*)     Hemoglobin 18.0 (*)     Hematocrit 51.9 (*)     Neutrophil % 81.8 (*)     Lymphocyte % 11.0 (*)     Eosinophil % 0.1 (*)     Neutrophils, Absolute 11.76 (*)     Monocytes, Absolute 0.91 (*)     Immature Grans, Absolute 0.06 (*)     All other components within normal limits   TROPONIN - Abnormal; Notable for the following components:    HS Troponin T 58 (*)     All other components within normal limits    Narrative:     High Sensitive Troponin T Reference Range:  <10.0 ng/L- Negative Female for AMI  <15.0 ng/L- Negative Male for AMI  >=10 - Abnormal Female indicating possible myocardial injury.  >=15 -  Abnormal Male indicating possible myocardial injury.   Clinicians would have to utilize clinical acumen, EKG, Troponin, and serial changes to determine if it is an Acute Myocardial Infarction or myocardial injury due to an underlying chronic condition.        URINALYSIS, MICROSCOPIC ONLY - Abnormal; Notable for the following components:    RBC, UA Too Numerous to Count (*)     WBC, UA Too Numerous to Count (*)     Bacteria, UA 2+ (*)     Squamous Epithelial Cells, UA 3-6 (*)     All other components within normal limits   HEMOGLOBIN A1C - Abnormal; Notable for the following components:    Hemoglobin A1C 8.00 (*)     All other components within normal limits    Narrative:     Hemoglobin A1C Ranges:    Increased Risk for Diabetes  5.7% to 6.4%  Diabetes                     >= 6.5%  Diabetic Goal                < 7.0%   POCT GLUCOSE FINGERSTICK - Abnormal; Notable for the following components:    Glucose 38 (*)     All other components within normal limits   POCT GLUCOSE FINGERSTICK - Abnormal; Notable for the following components:    Glucose 54 (*)     All other components within normal limits   POCT GLUCOSE FINGERSTICK - Normal   URINE CULTURE   BLOOD CULTURE   BLOOD CULTURE   RAINBOW DRAW    Narrative:     The following orders were created for panel order [place] Draw.  Procedure                               Abnormality         Status                     ---------                               -----------         ------                     Green Top (Gel)[[id number]]                                  Final result               Lavender Top[[id number]]                                     Final result               Gold Top - SST[[id number]]                                   Final result               Light Blue Top[[id number]]                                   Final result                 Please view results for these tests on the individual orders.   HIGH SENSITIVITIY TROPONIN T 2HR   PROCALCITONIN   LACTIC ACID, PLASMA    TROPONIN   POCT GLUCOSE FINGERSTICK   POCT GLUCOSE FINGERSTICK   POCT GLUCOSE FINGERSTICK   CBC AND DIFFERENTIAL    Narrative:     The following orders were created for panel order CBC & Differential.  Procedure                               Abnormality         Status                     ---------                               -----------         ------                     CBC Auto Differential[[id number]]        Abnormal            Final result                 Please view results for these tests on the individual orders.   GREEN TOP   LAVENDER TOP   GOLD TOP - SST   LIGHT BLUE TOP       EKG:   ECG 12 Lead Chest Pain    (Results Pending)       Meds given in ED:   Medications   dextrose (D50W) (25 g/50 mL) IV injection 25 g (has no administration in time range)   sodium chloride 0.9 % flush 10 mL (has no administration in time range)   nitroglycerin (NITROSTAT) SL tablet 0.4 mg (has no administration in time range)   acetaminophen (TYLENOL) tablet 650 mg (has no administration in time range)   ondansetron (ZOFRAN) tablet 4 mg (has no administration in time range)     Or   ondansetron (ZOFRAN) injection 4 mg (has no administration in time range)   melatonin tablet 3 mg (has no administration in time range)   aluminum-magnesium hydroxide-simethicone (MAALOX MAX) 400-400-40 MG/5ML suspension 15 mL (has no administration in time range)   cefTRIAXone (ROCEPHIN) 1 g in sodium chloride 0.9 % 100 mL IVPB-VTB (has no administration in time range)   dextrose (GLUTOSE) oral gel 15 g (has no administration in time range)   dextrose (D50W) (25 g/50 mL) IV injection 25 g (has no administration in time range)   glucagon (GLUCAGEN) injection 1 mg (has no administration in time range)   insulin lispro (ADMELOG) injection 0-9 Units (has no administration in time range)   morphine injection 4 mg (4 mg Intravenous Given 3/6/23 1831)   cefTRIAXone (ROCEPHIN) 1 g in sodium chloride 0.9 % 100 mL IVPB-VTB (0 g Intravenous Stopped 3/6/23  1941)       Imaging results:  XR Chest 1 View    Result Date: 3/6/2023  1. No active disease is seen in the chest with no significant change when compared to prior portable chest x-ray on 11/18/2022. The etiology of this patient's shortness of breath and chest pain is not established on this exam.  This report was finalized on 3/6/2023 7:47 PM by Dr. Tiago Boyd M.D.       Ambulatory status:   - assist x1    Social issues:   Social History     Socioeconomic History    Marital status:    Tobacco Use    Smoking status: Every Day     Packs/day: 1.00     Years: 50.00     Pack years: 50.00     Types: Cigarettes    Smokeless tobacco: Never   Substance and Sexual Activity    Alcohol use: No    Drug use: Never       NIH Stroke Scale:         Kati Sales RN  03/06/23 21:00 EST

## 2023-03-07 NOTE — PROGRESS NOTES
"DAILY PROGRESS NOTE  [facility]    Patient Identification:  Name: Isaiah Bruno  Age: 72 y.o.  Sex: male  :  1950  MRN: [id number]         Primary Care Physician: Patti Rees MD      Subjective  Patient with no new complaints.  States overall he is feeling better.  Still a lot of pain with voiding but feels like he is emptying his bladder better.  Patient refuses to use the hospital insulin.  He refuses to use hospital Valium.  He refused to see a cardiologist.....    Objective:  General Appearance:  Comfortable, in no acute distress, well-appearing and not in pain.    Vital signs: (most recent): Blood pressure 112/72, pulse 84, temperature 98.4 °F (36.9 °C), temperature source Oral, resp. rate 18, height 182.9 cm (72\"), weight 99.8 kg (220 lb), SpO2 92 %.    Lungs:  Normal effort and normal respiratory rate.  Breath sounds clear to auscultation.    Heart: Normal rate.  Regular rhythm.    Extremities: There is no dependent edema.    Neurological: Patient is alert.  (Oriented to person and place.  Oriented to date as far as the month.  Conversant.  Cooperative at present.).    Skin:  Warm and dry.                Vital signs in last 24 hours:  Temp:  [98.4 °F (36.9 °C)-98.6 °F (37 °C)] 98.4 °F (36.9 °C)  Heart Rate:  [71-84] 84  Resp:  [16-18] 18  BP: (112-155)/(71-87) 112/72    Intake/Output:    Intake/Output Summary (Last 24 hours) at 3/7/2023 1806  Last data filed at 3/6/2023 2322  Gross per 24 hour   Intake 120 ml   Output 30 ml   Net 90 ml         Results from last 7 days   Lab Units 23  0638 23  1626   WBC 10*3/mm3 12.01* 14.39*   HEMOGLOBIN g/dL 17.6 18.0*   PLATELETS 10*3/mm3 269 304     Results from last 7 days   Lab Units 23  0638 23  1626   SODIUM mmol/L 136 138   POTASSIUM mmol/L 4.1 4.1   CHLORIDE mmol/L 101 105   CO2 mmol/L 23.9 24.0   BUN mg/dL 19 18   CREATININE mg/dL 1.03 1.25   GLUCOSE mg/dL 164* 43*   Estimated Creatinine Clearance: 77 mL/min " (by C-G formula based on SCr of 1.03 mg/dL).  Results from last 7 days   Lab Units 03/07/23  0638 03/06/23  1626   CALCIUM mg/dL 9.4 9.7   ALBUMIN g/dL  --  4.3     Results from last 7 days   Lab Units 03/06/23  1626   ALBUMIN g/dL 4.3   BILIRUBIN mg/dL 0.5   ALK PHOS U/L 100   AST (SGOT) U/L 14   ALT (SGPT) U/L 12       Assessment:  UTI with urinary retention, dysuria, hematuria: Culture so far no growth.  Continue Rocephin.  Continue Pyridium.  Urology input appreciated.  Diabetes type 2: Patient refusing to follow sliding scale protocol.  Generalized anxiety disorder: Patient refused to use hospital Valium.  Being his only from home.  Hypertension: Continue home meds.  Behavioral issues: Patient very easily agitated.  Security had to be called to the floor last night.  Patient showing paranoid ideation.    All problems new to this examiner.    Plan:  Please see above.  Possible discharge tomorrow.    Marcos eJnnings MD  3/7/2023  18:06 EST

## 2023-03-07 NOTE — PLAN OF CARE
Goal Outcome Evaluation:   Refused tylenol, pyridium and flomax started, bladder scan ok, ivf infusing, abx given as ordered, CT completed, eloise meals.

## 2023-03-08 ENCOUNTER — READMISSION MANAGEMENT (OUTPATIENT)
Dept: CALL CENTER | Facility: HOSPITAL | Age: 73
End: 2023-03-08
Payer: MEDICARE

## 2023-03-08 VITALS
SYSTOLIC BLOOD PRESSURE: 115 MMHG | TEMPERATURE: 98.3 F | OXYGEN SATURATION: 92 % | DIASTOLIC BLOOD PRESSURE: 83 MMHG | RESPIRATION RATE: 18 BRPM | HEART RATE: 55 BPM | WEIGHT: 220 LBS | HEIGHT: 72 IN | BODY MASS INDEX: 29.8 KG/M2

## 2023-03-08 LAB
GLUCOSE BLDC GLUCOMTR-MCNC: 110 MG/DL (ref 70–130)
GLUCOSE BLDC GLUCOMTR-MCNC: 172 MG/DL (ref 70–130)
GLUCOSE BLDC GLUCOMTR-MCNC: 184 MG/DL (ref 70–130)
GLUCOSE BLDC GLUCOMTR-MCNC: 59 MG/DL (ref 70–130)

## 2023-03-08 PROCEDURE — 82962 GLUCOSE BLOOD TEST: CPT

## 2023-03-08 PROCEDURE — 63710000001: Performed by: HOSPITALIST

## 2023-03-08 RX ORDER — TAMSULOSIN HYDROCHLORIDE 0.4 MG/1
0.4 CAPSULE ORAL DAILY
Qty: 30 CAPSULE | Refills: 0 | Status: SHIPPED | OUTPATIENT
Start: 2023-03-09

## 2023-03-08 RX ORDER — OLANZAPINE 10 MG/1
5 INJECTION, POWDER, LYOPHILIZED, FOR SOLUTION INTRAMUSCULAR ONCE
Status: COMPLETED | OUTPATIENT
Start: 2023-03-08 | End: 2023-03-08

## 2023-03-08 RX ORDER — AMOXICILLIN AND CLAVULANATE POTASSIUM 875; 125 MG/1; MG/1
1 TABLET, FILM COATED ORAL 2 TIMES DAILY
Qty: 10 TABLET | Refills: 0 | Status: SHIPPED | OUTPATIENT
Start: 2023-03-08

## 2023-03-08 RX ORDER — PHENAZOPYRIDINE HYDROCHLORIDE 200 MG/1
200 TABLET, FILM COATED ORAL
Qty: 9 TABLET | Refills: 0 | Status: SHIPPED | OUTPATIENT
Start: 2023-03-08

## 2023-03-08 RX ADMIN — DIAZEPAM 5 MG: 5 TABLET ORAL at 08:48

## 2023-03-08 RX ADMIN — INSULIN ASPART 20 UNITS: 100 INJECTION, SUSPENSION SUBCUTANEOUS at 08:48

## 2023-03-08 RX ADMIN — TAMSULOSIN HYDROCHLORIDE 0.4 MG: 0.4 CAPSULE ORAL at 08:48

## 2023-03-08 RX ADMIN — PHENAZOPYRIDINE 200 MG: 200 TABLET ORAL at 08:48

## 2023-03-08 RX ADMIN — ESCITALOPRAM 20 MG: 20 TABLET, FILM COATED ORAL at 08:48

## 2023-03-08 RX ADMIN — OLANZAPINE 5 MG: 10 INJECTION, POWDER, FOR SOLUTION INTRAMUSCULAR at 01:40

## 2023-03-08 RX ADMIN — Medication 3 MG: at 01:01

## 2023-03-08 NOTE — PLAN OF CARE
"Goal Outcome Evaluation:            Pt refusing urinal but states his urine is \"not red.\" And he is \"emptying his bladder\". Refused bladder scan this shift. Aggressive and agitated at times. Dose zyprexa given once. Pt appears asleep in bed after.      "

## 2023-03-08 NOTE — CASE MANAGEMENT/SOCIAL WORK
Case Management Discharge Note      Final Note: Pt discharged home with spouse.   JULIETTE Verdugo RN         Selected Continued Care - Admitted Since 3/6/2023     Destination    No services have been selected for the patient.              Durable Medical Equipment    No services have been selected for the patient.              Dialysis/Infusion    No services have been selected for the patient.              Home Medical Care    No services have been selected for the patient.              Therapy    No services have been selected for the patient.              Community Resources    No services have been selected for the patient.              Community & DME    No services have been selected for the patient.                  Transportation Services  Private: Car    Final Discharge Disposition Code: 01 - home or self-care

## 2023-03-08 NOTE — DISCHARGE SUMMARY
"                                                                   PHYSICIAN DISCHARGE SUMMARY                                                                        [facility]    Patient Identification:  Name: Isaiah Bruno  Age: 72 y.o.  Sex: male  :  1950  MRN: [id number]  Primary Care Physician: Patti Rees MD    Admit date: 3/6/2023  Discharge date and time: 3/8/2023     Discharged Condition: good    Discharge Diagnoses:  UTI, urinary retention, dysuria, hematuria:  Diabetes type 2:  Hypertension:  Behavioral disorder:    Hospital Course:  72-year-old gentleman presents emergency room complaining of suprapubic pain and inability to pass his urine.  Please see H&P for full details.  On presentation as the UTI did show hematuria with 2+ bacteria and moderate leukocyte esterase positive nitrites.  Patient was admitted.  Started on IV antibiotics with form Rocephin.  Urology consult was obtained.  They recommended adding Flomax and Pyridium to the regime.  He remained afebrile throughout the hospitalization.  Unfortunate there was significant behavioral issues.  He was very easily agitated and in fact security had to be called at 1 point.  He refused to use the hospital insulin.  He refuses to use hospital Valium.  He did have mild elevated troponin and cardiology consultation was obtained but he refused to let the cardiologist see him.  He did consent to CT scan which showed a nonobstructing stone.  The last postvoid residual was reported to be in reasonable range.  He refused to have this rechecked again last night however.  When asked him about this he became very agitated.  He remains alert and oriented through all of this with no evidence of confusion or \"metabolic encephalopathy\".  He will be given oral antibiotics to finish his course in addition to 3 more days of Pyridium and daily Flomax.  He needs to follow-up with his PCP and or urology after discharge.  As I discussed the " patient's discharge plan he cuts me off because agitated.  This limited the discharge exam to a degree.    Addendum:  I was able to speak with the patient's wife this morning.  She notes that he has behavior issues at home also but they are always much worse when he is in the hospital.  She does not indicate any sense of feeling unsafe.    He has follow-up appointments both with his PCP and urology.    Consults:     Consults     Date and Time Order Name Status Description    3/8/2023  1:27 AM Inpatient Psychiatrist Consult Completed     3/7/2023  6:05 AM Inpatient Cardiology Consult Completed     3/7/2023 12:34 AM Inpatient Urology Consult      3/6/2023  6:50 PM [facility] (on-call MD unless specified) Details              Discharge Exam:  Afebrile vital signs stable.  Patient lying comfortably in bed.  Well-developed well-nourished male in no apparent distress.  Abdominal exam benign but before I can finish exam the patient stands up and gets in my face.  He is oriented to person and place but does not cooperate with a full Mini-Mental exam.    Disposition:  Home    Patient Instructions:      Discharge Medications      New Medications      Instructions Start Date   amoxicillin-clavulanate 875-125 MG per tablet  Commonly known as: Augmentin   1 tablet, Oral, 2 Times Daily      phenazopyridine 200 MG tablet  Commonly known as: PYRIDIUM   200 mg, Oral, 3 Times Daily With Meals      tamsulosin 0.4 MG capsule 24 hr capsule  Commonly known as: FLOMAX   0.4 mg, Oral, Daily   Start Date: March 9, 2023        Changes to Medications      Instructions Start Date   insulin aspart prot & aspart (70-30) 100 UNIT/ML suspension pen-injector injection  Commonly known as: NOVOLOG  What changed:   · when to take this  · additional instructions   Inject 45 units under the skin into the appropriate area as directed.         Continue These Medications      Instructions Start Date   amLODIPine-benazepril 10-40 MG per capsule  Commonly known as:  LOTREL   1 capsule, Oral, Daily      Cholecalciferol 50 MCG (2000 UT) tablet   Oral      escitalopram 10 MG tablet  Commonly known as: LEXAPRO   20 mg, Daily      meloxicam 15 MG tablet  Commonly known as: Mobic   15 mg, Oral, Daily      PARoxetine 20 MG tablet  Commonly known as: PAXIL   20 mg, Oral, Daily      Valium 5 MG tablet  Generic drug: diazePAM   5 mg, Oral, Every 8 Hours PRN         Stop These Medications    hydroCHLOROthiazide 12.5 MG tablet  Commonly known as: HYDRODIURIL     Lutein 10 MG tablet          Diet Instructions     Diet: Consistent Carbohydrate, Cardiac      Discharge Diet:  Consistent Carbohydrate  Cardiac           Future Appointments   Date Time Provider Department Center   3/13/2023  1:15 PM Patti Rees MD [id number]     Additional Instructions for the Follow-ups that You Need to Schedule     Discharge Follow-up with PCP   As directed       Currently Documented PCP:    Patti Rees MD    PCP Phone Number:    [phone]     Follow Up Details: 1 week            Follow-up Information     Patti Rees MD .    Specialty: Family Medicine  Why: 1 week  Contact information:  [id number] [address]  [phone]                       Discharge Order (From admission, onward)     Start     Ordered    03/08/23 1211  Discharge patient  Once        Expected Discharge Date: 03/08/23    Discharge Disposition: Home or Self Care    Physician of Record for Attribution - Please select from Treatment Team: GENE MCGUIRE [[id number]]    Review needed by CMO to determine Physician of Record: No       Question Answer Comment   Physician of Record for Attribution - Please select from Treatment Team GENE MCGUIRE    Review needed by CMO to determine Physician of Record No        03/08/23 1215                  Total time spent discharging patient including evaluation,post hospitalization follow up,  medication and post hospitalization instructions and  education total time exceeds 30 minutes.    Signed:  Marcos Jennings MD  3/8/2023  12:36 EST    EMR Dragon/Transcription disclaimer:   Much of this encounter note is an electronic transcription/translation of spoken language to printed text. The electronic translation of spoken language may permit erroneous, or at times, nonsensical words or phrases to be inadvertently transcribed; Although I have reviewed the note for such errors, some may still exist.

## 2023-03-08 NOTE — NURSING NOTE
Came on to shift with patient yelling in kimble. Pt had came out of room multiple times yelling about his medications and room temperature and was upset about everything.  Refused to go back to room and tried to leave the unit. Security was called. Patient went back to room and Savannah CORBIN  came to talk to him. Patient calmed down a little bit, despite him still being angry. He remained in his room.

## 2023-03-08 NOTE — PLAN OF CARE
Problem: Adult Inpatient Plan of Care  Goal: Plan of Care Review  Outcome: Met  Goal: Patient-Specific Goal (Individualized)  Outcome: Met  Goal: Absence of Hospital-Acquired Illness or Injury  Outcome: Met  Intervention: Identify and Manage Fall Risk  Recent Flowsheet Documentation  Taken 3/8/2023 1050 by Jody March, RN  Safety Promotion/Fall Prevention:   nonskid shoes/slippers when out of bed   fall prevention program maintained  Taken 3/8/2023 0848 by Jody March RN  Safety Promotion/Fall Prevention:   nonskid shoes/slippers when out of bed   fall prevention program maintained  Goal: Optimal Comfort and Wellbeing  Outcome: Met  Goal: Readiness for Transition of Care  Outcome: Met   Goal Outcome Evaluation:

## 2023-03-08 NOTE — CONSULTS
"IDENTIFYING INFORMATION: The patient is a 72-year-old white male with a history of a diagnosis of early dementia admitted with a urinary tract infection which has caused worsening confusion    CHIEF COMPLAINT: None given    INFORMANT: Patient wife and chart    RELIABILITY: Good    HISTORY OF PRESENT ILLNESS: The patient is a 72-year-old white male with a history of early dementia.  He was admitted with a urinary tract infection on 3/6/2023.  Staff reports that around midnight the patient came out of his room and became verbally aggressive with staff.  Attempts were made to calm the patient down but these were met with the patient's threats to leave the hospital.  The patient's wife reports that today he is doing well.  He is oriented x4 though wife does report he was diagnosed with mild cognitive impairment a couple of years ago and now feels as though he is suffering from some early dementia.  He is a bit irritable during interview repeatedly asking \"who sent you?\".  He has been treated for anxiety in the past per wife.  There is no reported use of alcohol tobaccos or street drugs.  Currently prescribed psychiatric medications include Lexapro and Valium.    PAST PSYCHIATRIC HISTORY: As above    PAST MEDICAL HISTORY: Significant for diabetes mellitus, hypertension, osteoarthritis, PVCs    MEDICATIONS:   Current Facility-Administered Medications   Medication Dose Route Frequency Provider Last Rate Last Admin   • acetaminophen (TYLENOL) tablet 650 mg  650 mg Oral Q4H PRN Viri Mariee APRN   650 mg at 03/07/23 2235   • aluminum-magnesium hydroxide-simethicone (MAALOX MAX) 400-400-40 MG/5ML suspension 15 mL  15 mL Oral Q6H PRN Viri Mariee APRN       • cefTRIAXone (ROCEPHIN) 1 g in sodium chloride 0.9 % 100 mL IVPB-VTB  1 g Intravenous Q24H Viri Mariee APRN 200 mL/hr at 03/07/23 1800 1 g at 03/07/23 1800   • dextrose (D50W) (25 g/50 mL) IV injection 25 g  25 g Intravenous Q15 Min PRN [place], " EFRAIN [place]       • dextrose (GLUTOSE) oral gel 15 g  15 g Oral Q15 Min PRN Viri Mariee APRN       • diazePAM (VALIUM) tablet 5 mg  5 mg Oral Q8H PRN Marcos Jennings MD   5 mg at 03/08/23 0848   • escitalopram (LEXAPRO) tablet 20 mg  20 mg Oral Daily Jose Lucas MD   20 mg at 03/08/23 0848   • glucagon (GLUCAGEN) injection 1 mg  1 mg Intramuscular Q15 Min PRN Viri Mariee APRN       • insulin aspart prot & aspart (NOVOLOG) (70-30) 100 UNIT/ML injection suspension pen-injector 42 Units  42 Units Subcutaneous BID AC Marcos Jennings MD   20 Units at 03/08/23 0848   • insulin lispro (ADMELOG) injection 0-9 Units  0-9 Units Subcutaneous TID AC Viri Mariee APRN   6 Units at 03/07/23 1344   • melatonin tablet 3 mg  3 mg Oral Nightly PRN Viri Mariee APRN   3 mg at 03/08/23 0101   • nitroglycerin (NITROSTAT) SL tablet 0.4 mg  0.4 mg Sublingual Q5 Min PRN Viri Mariee APRN       • ondansetron (ZOFRAN) tablet 4 mg  4 mg Oral Q6H PRN Viri Mariee APRN        Or   • ondansetron (ZOFRAN) injection 4 mg  4 mg Intravenous Q6H PRN Viri Mariee APRN       • phenazopyridine (PYRIDIUM) tablet 200 mg  200 mg Oral TID With Meals Gwyn Lechuga MD   200 mg at 03/08/23 0848   • sodium chloride 0.9 % flush 10 mL  10 mL Intravenous PRN Viri Mariee APRN       • tamsulosin (FLOMAX) 24 hr capsule 0.4 mg  0.4 mg Oral Daily Gwyn Lechuga MD   0.4 mg at 03/08/23 0848         ALLERGIES: None    FAMILY HISTORY: Noncontributory    SOCIAL HISTORY: The patient lives with his wife.  He is retired .  He denies abuse of any psychoactive substances.    MENTAL STATUS EXAM: The patient is a well-developed well-nourished elderly male appearing stated age.  He awakens without difficulty.  He is awake alert and oriented x4.  His mood is somewhat irritable his affect constricted.  Speech is generally relevant and coherent.  There are no  gross deficits in memory or cognition noted though the patient does not comply with formal testing.  He denies current suicidal or homicidal ideation.  There does appear to be some mild paranoia present.  His judgment and insight cannot be adequately assessed given his refusal to comply with interview.    ASSETS/LIABILITIES: Supportive wife/health issues    DIAGNOSTIC IMPRESSION: Metabolic encephalopathy secondary to urinary tract infection superimposed on mild cognitive impairment versus early dementia, medical problems described previously    PLAN: I will leave orders for as needed haloperidol should the patient have further episodes of sundowning or confusion.  He did surprisingly well during today's mental status examination and wife says that he appears to be returning to his baseline.  I have explained to patient and wife that I would expect his mentation to return to baseline with resolution of his urinary tract infection.  We will continue to follow with you.

## 2023-03-08 NOTE — PROGRESS NOTES
LOS: 2 days   Patient Care Team:  Patti Rees MD as PCP - General (Family Medicine)    Chief Complaint:  Dysuria.      Subjective     Interval History:     Patient Complaints: Dysuria mildly improved.      Review of Systems:    All systems were reviewed and negative except for:  Genitourinary: postivie for  pain    Objective     Vital Signs  Temp:  [97.8 °F (36.6 °C)-98.6 °F (37 °C)] 98.3 °F (36.8 °C)  Heart Rate:  [55-77] 55  Resp:  [16-18] 18  BP: (115-153)/(73-83) 115/83    Physical Exam:   No exam performed today,     Results Review:     I reviewed the patient's new clinical results.    Medication Review:     Assessment & Plan       Urinary tract infection with hematuria, site unspecified    Type 2 diabetes mellitus with diabetic polyneuropathy, with long-term current use of insulin (HCC)    Essential hypertension    BPH (benign prostatic hyperplasia)    TOMMY (generalized anxiety disorder)    GERD (gastroesophageal reflux disease)      Prostatitis.  Agree with antibx.  Small nonobstructing stone on ct scan.  Will schedule for cysto in office.      Plan for disposition:    Gwyn Lechuga MD  03/08/23  12:42 EST      Time:

## 2023-03-08 NOTE — NURSING NOTE
"At  0200am,  Patient  Comes  Out  Of  The  Room screaming  And  Yelling  At  Staff  ,  Screaming  He  Needs  Help.   Staff  tried  To  Calm  Patient  Down  And  Walk him  Back to the  Room but  Patient    Continues  To yell  And  Scream  And  Staff  Stating  He  Needs  Help,   Wants   To  Go to [facility]   .  Patient  Made  Attempts  To  Call  911  From  His  Room. Patient  Verbally  Aggressive  With  Staff.  Security  Called  As  A safety  Measure.  House  Manager  Notified. Patient   Refused    Tylenol  That  Was  Offered  Earlier  For  Pain.  Patient  Stated  \" I  Will  Not  Take  That    I need  7  Of  Them\".     APRN  Notified,  New order  Received  For  One  Time  Dose  Of  Morphine.  Same  Given  for  Penile  Pain  With  Good  Effect.  Patient  Also  Received  Valium  5mg. Finally  Settled   Down   At   2:50am  io  "

## 2023-03-09 ENCOUNTER — TRANSITIONAL CARE MANAGEMENT TELEPHONE ENCOUNTER (OUTPATIENT)
Dept: CALL CENTER | Facility: HOSPITAL | Age: 73
End: 2023-03-09
Payer: MEDICARE

## 2023-03-09 LAB — QT INTERVAL: 311 MS

## 2023-03-09 NOTE — OUTREACH NOTE
Prep Survey    Flowsheet Row Responses   [facility] patient discharged from? [place]   Is LACE score < 7 ? No   Eligibility [facility]   Date of Admission 03/06/23   Date of Discharge 03/08/23   Discharge Disposition Home or Self Care   Discharge diagnosis Urinary tract infection    Does the patient have one of the following disease processes/diagnoses(primary or secondary)? Other   Does the patient have Home health ordered? No   Is there a DME ordered? No   Prep survey completed? Yes          Katelyn CODY - Registered Nurse

## 2023-03-09 NOTE — OUTREACH NOTE
Call Center TCM Note    Flowsheet Row Responses   [facility] [place] patient discharged from? [place]   Does the patient have one of the following disease processes/diagnoses(primary or secondary)? Other   TCM attempt successful? No  [No verbal release.]   Unsuccessful attempts Attempt 1   Call Status Left message  [Left message with wife who was not on verbal release.]          Darcy Rawls RN    3/9/2023, 09:01 EST

## 2023-03-09 NOTE — OUTREACH NOTE
Call Center TCM Note    Flowsheet Row Responses   [facility] patient discharged from? [place]   Does the patient have one of the following disease processes/diagnoses(primary or secondary)? Other   TCM attempt successful? Yes   Call start time 1532   Call end time 1540   Meds reviewed with patient/caregiver? Yes   Is the patient having any side effects they believe may be caused by any medication additions or changes? No   Does the patient have all medications ordered at discharge? Yes   Is the patient taking all medications as directed (includes completed medication regime)? Yes   Comments PCP hospital f/u appt 03/13/23.    Does the patient have an appointment with their PCP within 7 days of discharge? Yes   Has home health visited the patient within 72 hours of discharge? N/A   Psychosocial issues? No   Did the patient receive a copy of their discharge instructions? Yes   Nursing interventions Reviewed instructions with patient   What is the patient's perception of their health status since discharge? Same   Is the patient/caregiver able to teach back signs and symptoms related to disease process for when to call PCP? Yes   Is the patient/caregiver able to teach back signs and symptoms related to disease process for when to call 911? Yes   Is the patient/caregiver able to teach back the hierarchy of who to call/visit for symptoms/problems? PCP, Specialist, Home health nurse, Urgent Care, ED, 911 Yes   If the patient is a current smoker, are they able to teach back resources for cessation? --  [Continues to use tobacco.]   TCM call completed? Yes   Wrap up additional comments Patient states is about the same. States still has painful urination, but has improved since discharge. Reviewed s/s of UTI. States will keep PCP appt 03/13/23.   Call end time 1540   Would this patient benefit from a Referral to Amb Social Work? No   Is the patient interested in additional calls from an ambulatory ?   NOTE:  applies to high risk patients requiring additional follow-up. Dayan Rawls RN    3/9/2023, 15:42 EST

## 2023-03-12 LAB
BACTERIA SPEC AEROBE CULT: NORMAL
BACTERIA SPEC AEROBE CULT: NORMAL

## 2023-03-30 ENCOUNTER — READMISSION MANAGEMENT (OUTPATIENT)
Dept: CALL CENTER | Facility: HOSPITAL | Age: 73
End: 2023-03-30
Payer: MEDICARE

## 2023-03-30 NOTE — OUTREACH NOTE
Medical Week 3 Survey    Flowsheet Row Responses   [facility] patient discharged from? [place]   Does the patient have one of the following disease processes/diagnoses(primary or secondary)? Other   Week 3 attempt successful? No   Unsuccessful attempts Attempt 1          Kari Rodriguez Licensed Nurse

## 2023-04-20 NOTE — TELEPHONE ENCOUNTER
Spoke to [facility] care ori and gave them directions -   Injects 45 units under the skin twice daily.  They are filling 6 boxes for a 90 day supply.  Pt was informed.   Olumiant Counseling: I discussed with the patient the risks of Olumiant therapy including but not limited to upper respiratory tract infections, shingles, cold sores, and nausea. Live vaccines should be avoided.  This medication has been linked to serious infections; higher rate of mortality; malignancy and lymphoproliferative disorders; major adverse cardiovascular events; thrombosis; gastrointestinal perforations; neutropenia; lymphopenia; anemia; liver enzyme elevations; and lipid elevations.

## 2024-04-10 ENCOUNTER — HOSPITAL ENCOUNTER (EMERGENCY)
Facility: HOSPITAL | Age: 74
Discharge: HOME | End: 2024-04-11
Attending: STUDENT IN AN ORGANIZED HEALTH CARE EDUCATION/TRAINING PROGRAM
Payer: MEDICARE

## 2024-04-10 VITALS
SYSTOLIC BLOOD PRESSURE: 164 MMHG | HEIGHT: 72 IN | WEIGHT: 220.02 LBS | HEART RATE: 76 BPM | DIASTOLIC BLOOD PRESSURE: 88 MMHG | RESPIRATION RATE: 16 BRPM | OXYGEN SATURATION: 98 % | BODY MASS INDEX: 29.8 KG/M2 | TEMPERATURE: 98.2 F

## 2024-04-10 DIAGNOSIS — R45.1 AGITATION: Primary | ICD-10-CM

## 2024-04-10 LAB
ALBUMIN SERPL-MCNC: 3.4 G/DL (ref 3.5–5.2)
ALBUMIN/GLOB SERPL: 1.2 G/DL
ALP SERPL-CCNC: 94 U/L (ref 39–117)
ALT SERPL W P-5'-P-CCNC: 15 U/L (ref 1–41)
ANION GAP SERPL CALCULATED.3IONS-SCNC: 9.9 MMOL/L (ref 5–15)
AST SERPL-CCNC: 14 U/L (ref 1–40)
BASOPHILS # BLD AUTO: 0.03 10*3/MM3 (ref 0–0.2)
BASOPHILS NFR BLD AUTO: 0.3 % (ref 0–1.5)
BILIRUB SERPL-MCNC: 0.4 MG/DL (ref 0–1.2)
BUN SERPL-MCNC: 20 MG/DL (ref 8–23)
BUN/CREAT SERPL: 19 (ref 7–25)
CALCIUM SPEC-SCNC: 8.9 MG/DL (ref 8.6–10.5)
CHLORIDE SERPL-SCNC: 100 MMOL/L (ref 98–107)
CO2 SERPL-SCNC: 23.1 MMOL/L (ref 22–29)
CREAT SERPL-MCNC: 1.05 MG/DL (ref 0.76–1.27)
DEPRECATED RDW RBC AUTO: 41.7 FL (ref 37–54)
EGFRCR SERPLBLD CKD-EPI 2021: 74 ML/MIN/1.73
EOSINOPHIL # BLD AUTO: 0.09 10*3/MM3 (ref 0–0.4)
EOSINOPHIL NFR BLD AUTO: 1 % (ref 0.3–6.2)
ERYTHROCYTE [DISTWIDTH] IN BLOOD BY AUTOMATED COUNT: 13.1 % (ref 12.3–15.4)
GLOBULIN UR ELPH-MCNC: 2.8 GM/DL
GLUCOSE SERPL-MCNC: 314 MG/DL (ref 65–99)
HCT VFR BLD AUTO: 45 % (ref 37.5–51)
HGB BLD-MCNC: 14.8 G/DL (ref 13–17.7)
IMM GRANULOCYTES # BLD AUTO: 0.05 10*3/MM3 (ref 0–0.05)
IMM GRANULOCYTES NFR BLD AUTO: 0.6 % (ref 0–0.5)
LYMPHOCYTES # BLD AUTO: 1.01 10*3/MM3 (ref 0.7–3.1)
LYMPHOCYTES NFR BLD AUTO: 11.3 % (ref 19.6–45.3)
MCH RBC QN AUTO: 28.8 PG (ref 26.6–33)
MCHC RBC AUTO-ENTMCNC: 32.9 G/DL (ref 31.5–35.7)
MCV RBC AUTO: 87.5 FL (ref 79–97)
MONOCYTES # BLD AUTO: 0.72 10*3/MM3 (ref 0.1–0.9)
MONOCYTES NFR BLD AUTO: 8 % (ref 5–12)
NEUTROPHILS NFR BLD AUTO: 7.07 10*3/MM3 (ref 1.7–7)
NEUTROPHILS NFR BLD AUTO: 78.8 % (ref 42.7–76)
NRBC BLD AUTO-RTO: 0 /100 WBC (ref 0–0.2)
PLATELET # BLD AUTO: 366 10*3/MM3 (ref 140–450)
PMV BLD AUTO: 11.1 FL (ref 6–12)
POTASSIUM SERPL-SCNC: 4.8 MMOL/L (ref 3.5–5.2)
PROT SERPL-MCNC: 6.2 G/DL (ref 6–8.5)
RBC # BLD AUTO: 5.14 10*6/MM3 (ref 4.14–5.8)
SODIUM SERPL-SCNC: 133 MMOL/L (ref 136–145)
WBC NRBC COR # BLD AUTO: 8.97 10*3/MM3 (ref 3.4–10.8)

## 2024-04-10 PROCEDURE — 36415 COLL VENOUS BLD VENIPUNCTURE: CPT

## 2024-04-10 PROCEDURE — 80053 COMPREHEN METABOLIC PANEL: CPT | Performed by: STUDENT IN AN ORGANIZED HEALTH CARE EDUCATION/TRAINING PROGRAM

## 2024-04-10 PROCEDURE — 85025 COMPLETE CBC W/AUTO DIFF WBC: CPT | Performed by: STUDENT IN AN ORGANIZED HEALTH CARE EDUCATION/TRAINING PROGRAM

## 2024-04-10 PROCEDURE — 99283 EMERGENCY DEPT VISIT LOW MDM: CPT

## 2024-04-11 PROCEDURE — 25010000002 HALOPERIDOL LACTATE PER 5 MG: Performed by: STUDENT IN AN ORGANIZED HEALTH CARE EDUCATION/TRAINING PROGRAM

## 2024-04-11 PROCEDURE — 96374 THER/PROPH/DIAG INJ IV PUSH: CPT

## 2024-04-11 RX ORDER — RISPERIDONE 0.5 MG/1
2 TABLET, ORALLY DISINTEGRATING ORAL ONCE
Status: COMPLETED | OUTPATIENT
Start: 2024-04-11 | End: 2024-04-11

## 2024-04-11 RX ORDER — HALOPERIDOL 5 MG/ML
2 INJECTION INTRAMUSCULAR ONCE
Status: COMPLETED | OUTPATIENT
Start: 2024-04-11 | End: 2024-04-11

## 2024-04-11 RX ADMIN — HALOPERIDOL LACTATE 2 MG: 5 INJECTION, SOLUTION INTRAMUSCULAR at 01:41

## 2024-04-11 RX ADMIN — RISPERIDONE 2 MG: 0.5 TABLET, ORALLY DISINTEGRATING ORAL at 00:57

## 2024-04-11 NOTE — ED NOTES
Spoke with Meron Ruiz at [facility] who states that transport service is in route right now to collect patient to take to their facility. Meron RUIZ did state they would like an IV in place upon arrival and for patient to be medicated to keep him calm. Orders given per Emilee GARCIA.

## 2024-04-11 NOTE — ED PROVIDER NOTES
EMERGENCY DEPARTMENT ENCOUNTER    Room Number:  23/23  PCP: Jennifer Zepeda MD  History obtained from: Patient, family      HPI:  Chief Complaint: Transition to inpatient palliative care  A complete HPI/ROS/PMH/PSH/SH/FH are unobtainable due to: Patient's mental status  Context: Isaiah Bruno is a 73 y.o. male who presents to the ED c/o no complaints.  Patient became agitated earlier in the evening and is planning to be transferred to inpatient palliative care service.  Has no acute complaints today.  Currently not agitated.            PAST MEDICAL HISTORY  Active Ambulatory Problems     Diagnosis Date Noted    Type 2 diabetes mellitus with diabetic polyneuropathy, with long-term current use of insulin 04/01/2016    Essential hypertension 04/01/2016    Osteoarthritis of knee 04/01/2016    Chronic anxiety 04/01/2016    Acute arthritis 04/22/2016    BPH (benign prostatic hyperplasia) 01/30/2018    TOMMY (generalized anxiety disorder) 01/30/2018    GERD (gastroesophageal reflux disease) 01/30/2018    Osteoarthritis, generalized 01/12/2016    Painful diabetic neuropathy 09/30/2019    Multiple falls 11/18/2022    Arthritis of both feet 10/25/2022    Metatarsalgia 11/18/2022    Urinary tract infection with hematuria, site unspecified 03/06/2023     Resolved Ambulatory Problems     Diagnosis Date Noted    Tendinitis of forearm 04/01/2016    Encounter for hepatitis C screening test for low risk patient 08/12/2020    Hypoglycemia 11/18/2022     Past Medical History:   Diagnosis Date    Anxiety     Diabetes mellitus     Hypertension     Osteoarthritis     Premature ventricular contractions          PAST SURGICAL HISTORY  Past Surgical History:   Procedure Laterality Date    CATARACT EXTRACTION      EYE CAPSULOTOMY WITH LASER      FINGER TENDON REPAIR      right ring finger    HIATAL HERNIA REPAIR  1978    KNEE ARTHROSCOPY           FAMILY HISTORY  Family History   Problem Relation Age of Onset    Stroke Mother     Diabetes  Mother     Anxiety disorder Mother     No Known Problems Father          SOCIAL HISTORY  Social History     Socioeconomic History    Marital status:    Tobacco Use    Smoking status: Every Day     Current packs/day: 1.00     Average packs/day: 1 pack/day for 50.0 years (50.0 ttl pk-yrs)     Types: Cigarettes    Smokeless tobacco: Never   Vaping Use    Vaping status: Never Used   Substance and Sexual Activity    Alcohol use: No    Drug use: Never         ALLERGIES  Patient has no known allergies.        REVIEW OF SYSTEMS    As per HPI      PHYSICAL EXAM  ED Triage Vitals [04/10/24 2202]   Temp Heart Rate Resp BP SpO2   98.2 °F (36.8 °C) 76 16 164/88 98 %      Temp src Heart Rate Source Patient Position BP Location FiO2 (%)   Tympanic Monitor Sitting Right arm --       Physical Exam  Constitutional:       General: He is not in acute distress.  HENT:      Head: Normocephalic and atraumatic.   Cardiovascular:      Rate and Rhythm: Normal rate and regular rhythm.   Pulmonary:      Effort: No respiratory distress.   Abdominal:      General: There is no distension.      Tenderness: There is no abdominal tenderness.   Musculoskeletal:         General: No swelling or deformity.   Skin:     General: Skin is warm and dry.   Neurological:      General: No focal deficit present.      Mental Status: He is alert. Mental status is at baseline.           Vital signs and nursing notes reviewed.          LAB RESULTS  Recent Results (from the past 24 hour(s))   Comprehensive Metabolic Panel    Collection Time: 04/10/24 11:27 PM    Specimen: Blood   Result Value Ref Range    Glucose 314 (H) 65 - 99 mg/dL    BUN 20 8 - 23 mg/dL    Creatinine 1.05 0.76 - 1.27 mg/dL    Sodium 133 (L) 136 - 145 mmol/L    Potassium 4.8 3.5 - 5.2 mmol/L    Chloride 100 98 - 107 mmol/L    CO2 23.1 22.0 - 29.0 mmol/L    Calcium 8.9 8.6 - 10.5 mg/dL    Total Protein 6.2 6.0 - 8.5 g/dL    Albumin 3.4 (L) 3.5 - 5.2 g/dL    ALT (SGPT) 15 1 - 41 U/L    AST  (SGOT) 14 1 - 40 U/L    Alkaline Phosphatase 94 39 - 117 U/L    Total Bilirubin 0.4 0.0 - 1.2 mg/dL    Globulin 2.8 gm/dL    A/G Ratio 1.2 g/dL    BUN/Creatinine Ratio 19.0 7.0 - 25.0    Anion Gap 9.9 5.0 - 15.0 mmol/L    eGFR 74.0 >60.0 mL/min/1.73   CBC Auto Differential    Collection Time: 04/10/24 11:27 PM    Specimen: Blood   Result Value Ref Range    WBC 8.97 3.40 - 10.80 10*3/mm3    RBC 5.14 4.14 - 5.80 10*6/mm3    Hemoglobin 14.8 13.0 - 17.7 g/dL    Hematocrit 45.0 37.5 - 51.0 %    MCV 87.5 79.0 - 97.0 fL    MCH 28.8 26.6 - 33.0 pg    MCHC 32.9 31.5 - 35.7 g/dL    RDW 13.1 12.3 - 15.4 %    RDW-SD 41.7 37.0 - 54.0 fl    MPV 11.1 6.0 - 12.0 fL    Platelets 366 140 - 450 10*3/mm3    Neutrophil % 78.8 (H) 42.7 - 76.0 %    Lymphocyte % 11.3 (L) 19.6 - 45.3 %    Monocyte % 8.0 5.0 - 12.0 %    Eosinophil % 1.0 0.3 - 6.2 %    Basophil % 0.3 0.0 - 1.5 %    Immature Grans % 0.6 (H) 0.0 - 0.5 %    Neutrophils, Absolute 7.07 (H) 1.70 - 7.00 10*3/mm3    Lymphocytes, Absolute 1.01 0.70 - 3.10 10*3/mm3    Monocytes, Absolute 0.72 0.10 - 0.90 10*3/mm3    Eosinophils, Absolute 0.09 0.00 - 0.40 10*3/mm3    Basophils, Absolute 0.03 0.00 - 0.20 10*3/mm3    Immature Grans, Absolute 0.05 0.00 - 0.05 10*3/mm3    nRBC 0.0 0.0 - 0.2 /100 WBC       Ordered the above labs and reviewed the results.        RADIOLOGY  No Radiology Exams Resulted Within Past 24 Hours    Ordered the above noted radiological studies. Reviewed by me in PACS.              MEDICATIONS GIVEN IN ER  Medications   haloperidol lactate (HALDOL) injection 2 mg (has no administration in time range)   risperiDONE (risperDAL M-TABS) disintegrating tablet 2 mg (2 mg Oral Given 4/11/24 0057)               MEDICAL DECISION MAKING, PROGRESS, and CONSULTS    MDM: Patient presented emergency department with intermittent agitation, plan to transition to inpatient palliative care service.  Otherwise well-appearing, vitals otherwise stable.  No other acute complaints per  family.  Labs reassuring.  Given sedating medications in the ER to aid with transition to inpatient stay.  Given return precautions and discharged home.    All labs have been independently reviewed by me.  All radiology studies have been reviewed by me and I have also reviewed the radiology report.   EKG's independently viewed and interpreted by me.  Discussion below represents my analysis of pertinent findings related to patient's condition, differential diagnosis, treatment plan and final disposition.      Additional sources:  - Discussed/ obtained information from independent historians: Family members at bedside    - External (non-ED) record review:     - Chronic or social conditions impacting care: Hospice    - Shared decision making: Discussed plan for transfer/discharge, family agrees      Orders placed during this visit:  Orders Placed This Encounter   Procedures    Comprehensive Metabolic Panel    CBC Auto Differential    CBC & Differential         Additional orders considered but not ordered:  Considered additional workup however no acute complaints        Differential diagnosis includes but is not limited to:    Agitation, dementia, end-of-life      Independent interpretation of labs, radiology studies, and discussions with consultants:           DIAGNOSIS  Final diagnoses:   Agitation         DISPOSITION  Discharged         Latest Documented Vital Signs:  As of 01:38 EDT  BP- 164/88 HR- 76 Temp- 98.2 °F (36.8 °C) (Tympanic) O2 sat- 98%              --    Please note that portions of this were completed with a voice recognition program.       Note Disclaimer: At [facility], we believe that sharing information builds trust and better relationships. You are receiving this note because you are receiving care at [facility] or recently visited. It is possible you will see health information before a provider has talked with you about it. This kind of information can be easy to misunderstand. To help  you fully understand what it means for your health, we urge you to discuss this note with your provider.             Fransisco Hebert MD  04/11/24 0140

## 2024-04-11 NOTE — ED NOTES
Spoke with Meron CORBIN at [facility] ([facility]) about potential patient transfer to their facility after medical clearance. RN states she will speak with her charge nurse and report back to us, they were given desk phone number and Hope Charge RN name. [facility] # [phone].

## 2024-11-18 NOTE — NURSING NOTE
"Around 0020, patient came out of room and got verbally aggressive with staff . States he needed his blood sugar taken. Several staff members tried to calm patient down and get him back to his room. Pt getting increasingly agitated and continues to be verbally aggressive to staff. Security called. Patient allowed blood sugar to be taken. Blood sugar low and patient at first refused any carbohydrates to increase sugar. Said he was \"going home\" and would \"increase sugar there\". Staff continued to try to calm patient down. Patient finally agreed to peanut butter crackers and pepsi. Blood sugar normal upon recheck. Patient continues to be verbally aggressive so hm and security called again to talk to patient. APRN called and currently waiting for a call back.   " Small round circumscribed lesion appreciated on palpation in right breast; favor cyst but diagnostic mammogram ordered with recommendation to complete within next 8 weeks.